# Patient Record
Sex: MALE | Race: WHITE | NOT HISPANIC OR LATINO | Employment: FULL TIME
[De-identification: names, ages, dates, MRNs, and addresses within clinical notes are randomized per-mention and may not be internally consistent; named-entity substitution may affect disease eponyms.]

---

## 2017-04-22 ENCOUNTER — HOSPITAL (OUTPATIENT)
Dept: OTHER | Age: 27
End: 2017-04-22
Attending: EMERGENCY MEDICINE

## 2017-04-22 LAB
ALBUMIN SERPL-MCNC: 4.3 GM/DL (ref 3.6–5.1)
ALP SERPL-CCNC: 47 UNIT/L (ref 45–117)
ALT SERPL-CCNC: 22 UNIT/L
AMPHETAMINES UR QL SCN>500 NG/ML: NEGATIVE
ANALYZER ANC (IANC): ABNORMAL
ANION GAP SERPL CALC-SCNC: 12 MMOL/L (ref 10–20)
AST SERPL-CCNC: 17 UNIT/L
BARBITURATES UR QL SCN>200 NG/ML: NEGATIVE
BENZODIAZ UR QL SCN>200 NG/ML: NEGATIVE
BILIRUB CONJ SERPL-MCNC: 0.1 MG/DL (ref 0–0.2)
BILIRUB SERPL-MCNC: 0.7 MG/DL (ref 0.2–1)
BUN SERPL-MCNC: 12 MG/DL (ref 6–20)
BUN/CREAT SERPL: 14 (ref 7–25)
BZE UR QL SCN>150 NG/ML: NEGATIVE
CALCIUM SERPL-MCNC: 9 MG/DL (ref 8.4–10.2)
CANNABINOIDS UR QL SCN>50 NG/ML: NEGATIVE
CHLORIDE: 101 MMOL/L (ref 98–107)
CO2 SERPL-SCNC: 28 MMOL/L (ref 21–32)
CREAT SERPL-MCNC: 0.85 MG/DL (ref 0.67–1.17)
ERYTHROCYTE [DISTWIDTH] IN BLOOD: 12.7 % (ref 11–15)
ETHANOL SERPL-MCNC: NORMAL MG/DL
GLUCOSE SERPL-MCNC: 95 MG/DL (ref 65–99)
HEMATOCRIT: 38.8 % (ref 39–51)
HGB BLD-MCNC: 13.8 GM/DL (ref 13–17)
MCH RBC QN AUTO: 29.7 PG (ref 26–34)
MCHC RBC AUTO-ENTMCNC: 35.6 GM/DL (ref 32–36.5)
MCV RBC AUTO: 83.4 FL (ref 78–100)
METHADONE UR QL SCN>300 NG/ML: NEGATIVE NG/ML
OPIATES UR QL SCN>300 NG/ML: NEGATIVE
PCP UR QL SCN>25 NG/ML: NEGATIVE
PLATELET # BLD: 230 THOUSAND/MCL (ref 140–450)
POTASSIUM SERPL-SCNC: 4.2 MMOL/L (ref 3.4–5.1)
PROT SERPL-MCNC: 7.6 GM/DL (ref 6.4–8.2)
RBC # BLD: 4.65 MILLION/MCL (ref 4.5–5.9)
SODIUM SERPL-SCNC: 137 MMOL/L (ref 135–145)
WBC # BLD: 7.4 THOUSAND/MCL (ref 4.2–11)

## 2017-04-24 ENCOUNTER — HOSPITAL (OUTPATIENT)
Dept: OTHER | Age: 27
End: 2017-04-24
Attending: EMERGENCY MEDICINE

## 2017-04-25 ENCOUNTER — DIAGNOSTIC TRANS (OUTPATIENT)
Dept: OTHER | Age: 27
End: 2017-04-25

## 2021-04-05 ENCOUNTER — HOSPITAL ENCOUNTER (OUTPATIENT)
Age: 31
Discharge: HOME | End: 2021-04-05
Attending: PHYSICIAN ASSISTANT
Payer: MEDICAID

## 2021-04-05 VITALS
BODY MASS INDEX: 25.67 KG/M2 | TEMPERATURE: 98 F | WEIGHT: 200 LBS | SYSTOLIC BLOOD PRESSURE: 120 MMHG | OXYGEN SATURATION: 99 % | DIASTOLIC BLOOD PRESSURE: 67 MMHG | RESPIRATION RATE: 18 BRPM | HEIGHT: 74 IN | HEART RATE: 76 BPM

## 2021-04-05 DIAGNOSIS — J02.0 ACUTE STREPTOCOCCAL PHARYNGITIS: Primary | ICD-10-CM

## 2021-04-05 PROCEDURE — 87880 STREP A ASSAY W/OPTIC: CPT | Performed by: PHYSICIAN ASSISTANT

## 2021-04-05 PROCEDURE — 99203 OFFICE O/P NEW LOW 30 MIN: CPT | Performed by: PHYSICIAN ASSISTANT

## 2021-04-05 RX ORDER — ARIPIPRAZOLE 15 MG/1
15 TABLET ORAL DAILY
COMMUNITY

## 2021-04-05 RX ORDER — PENICILLIN V POTASSIUM 500 MG/1
500 TABLET ORAL 2 TIMES DAILY
Qty: 20 TABLET | Refills: 0 | Status: SHIPPED | OUTPATIENT
Start: 2021-04-05 | End: 2021-04-15

## 2021-04-05 RX ORDER — SERTRALINE HYDROCHLORIDE 100 MG/1
100 TABLET, FILM COATED ORAL DAILY
COMMUNITY

## 2021-04-05 RX ORDER — IBUPROFEN 600 MG/1
TABLET ORAL
Qty: 20 TABLET | Refills: 0 | Status: SHIPPED | OUTPATIENT
Start: 2021-04-05

## 2021-04-05 NOTE — ED PROVIDER NOTES
Patient Seen in: [facility]    History   Patient presents with:  Sore Throat    Stated Complaint: flu symptoms    HPI    79-year-old male presents with chief complaint of sore throat. Onset 1 week ago.   Patient reports associated generalized (Room air)       Current:/67   Pulse 76   Temp 98 °F (36.7 °C) (Temporal)   Resp 18   Ht 188 cm (6' 2\")   Wt 90.7 kg   SpO2 99%   BMI 25.68 kg/m²     PULSE OX within normal limits on room air as interpreted by this provider.     Constitutional: The p over serial reexaminations as previously documented. Results reviewed with patient. I have given the patient instructions regarding their diagnoses, expectations, follow up, and ER precautions.  I explained to the patient that emergent conditions may ar

## 2021-04-13 ENCOUNTER — APPOINTMENT (OUTPATIENT)
Dept: GENERAL RADIOLOGY | Age: 31
End: 2021-04-13
Attending: NURSE PRACTITIONER
Payer: MEDICAID

## 2021-04-13 ENCOUNTER — HOSPITAL ENCOUNTER (EMERGENCY)
Facility: HOSPITAL | Age: 31
Discharge: HOME | End: 2021-04-13
Attending: EMERGENCY MEDICINE
Payer: MEDICAID

## 2021-04-13 ENCOUNTER — HOSPITAL ENCOUNTER (OUTPATIENT)
Age: 31
Discharge: HOME | End: 2021-04-13
Payer: MEDICAID

## 2021-04-13 VITALS
DIASTOLIC BLOOD PRESSURE: 84 MMHG | SYSTOLIC BLOOD PRESSURE: 134 MMHG | RESPIRATION RATE: 18 BRPM | TEMPERATURE: 99 F | HEART RATE: 69 BPM | OXYGEN SATURATION: 97 %

## 2021-04-13 VITALS
WEIGHT: 200 LBS | OXYGEN SATURATION: 97 % | HEART RATE: 75 BPM | TEMPERATURE: 97 F | SYSTOLIC BLOOD PRESSURE: 120 MMHG | HEIGHT: 74 IN | RESPIRATION RATE: 16 BRPM | DIASTOLIC BLOOD PRESSURE: 71 MMHG | BODY MASS INDEX: 25.67 KG/M2

## 2021-04-13 DIAGNOSIS — R07.89 CHEST PAIN, ATYPICAL: Primary | ICD-10-CM

## 2021-04-13 DIAGNOSIS — R06.02 SHORTNESS OF BREATH: ICD-10-CM

## 2021-04-13 DIAGNOSIS — R05.9 COUGH: Primary | ICD-10-CM

## 2021-04-13 PROCEDURE — 99284 EMERGENCY DEPT VISIT MOD MDM: CPT

## 2021-04-13 PROCEDURE — 93000 ELECTROCARDIOGRAM COMPLETE: CPT | Performed by: NURSE PRACTITIONER

## 2021-04-13 PROCEDURE — 93010 ELECTROCARDIOGRAM REPORT: CPT | Performed by: EMERGENCY MEDICINE

## 2021-04-13 PROCEDURE — 36415 COLL VENOUS BLD VENIPUNCTURE: CPT

## 2021-04-13 PROCEDURE — 84484 ASSAY OF TROPONIN QUANT: CPT | Performed by: EMERGENCY MEDICINE

## 2021-04-13 PROCEDURE — 80047 BASIC METABLC PNL IONIZED CA: CPT | Performed by: NURSE PRACTITIONER

## 2021-04-13 PROCEDURE — 36415 COLL VENOUS BLD VENIPUNCTURE: CPT | Performed by: NURSE PRACTITIONER

## 2021-04-13 PROCEDURE — 85025 COMPLETE CBC W/AUTO DIFF WBC: CPT | Performed by: NURSE PRACTITIONER

## 2021-04-13 PROCEDURE — 93005 ELECTROCARDIOGRAM TRACING: CPT

## 2021-04-13 PROCEDURE — U0002 COVID-19 LAB TEST NON-CDC: HCPCS | Performed by: NURSE PRACTITIONER

## 2021-04-13 PROCEDURE — 99215 OFFICE O/P EST HI 40 MIN: CPT | Performed by: NURSE PRACTITIONER

## 2021-04-13 PROCEDURE — 71046 X-RAY EXAM CHEST 2 VIEWS: CPT | Performed by: NURSE PRACTITIONER

## 2021-04-13 PROCEDURE — 84484 ASSAY OF TROPONIN QUANT: CPT

## 2021-04-13 RX ORDER — IBUPROFEN 600 MG/1
600 TABLET ORAL EVERY 8 HOURS PRN
Qty: 30 TABLET | Refills: 0 | Status: SHIPPED | OUTPATIENT
Start: 2021-04-13 | End: 2021-04-20

## 2021-04-13 RX ORDER — ALBUTEROL SULFATE 90 UG/1
2 AEROSOL, METERED RESPIRATORY (INHALATION) EVERY 4 HOURS PRN
Qty: 1 INHALER | Refills: 0 | Status: SHIPPED | OUTPATIENT
Start: 2021-04-13 | End: 2021-05-13

## 2021-04-13 NOTE — ED PROVIDER NOTES
Patient Seen in: [facility]      History   Patient presents with:  Shortness Of Breath    Stated Complaint: difficulty breathing/chills  post strep+    HPI/Subjective:   HPI    This is a well-appearing 82-year-old who presents with a chief co external ear normal.      Left Ear: Tympanic membrane, ear canal and external ear normal.      Nose: Nose normal.      Mouth/Throat:      Mouth: Mucous membranes are moist.      Pharynx: Oropharynx is clear. Uvula midline.  No pharyngeal swelling, oropharyn with patient that the blood work here as well as imaging was normal.  Not hypoxic, not tachycardic. Very well-appearing.   I have given the patient a list of primary care providers to follow-up with and I have encouraged him to call today to schedule an ap

## 2021-04-13 NOTE — ED INITIAL ASSESSMENT (HPI)
Dx with strep on 4/5 in this immediate care. Still on antibiotics. Now c/o cough and shortness of breath with exertion.

## 2021-04-14 NOTE — ED INITIAL ASSESSMENT (HPI)
Pt dx strep 4/5 on abx seen at IC today for cough/SOB that began today. PT had negative Cxr, covid test, EKG, and labs at IC. Pt returns to ED tonight with new c/o chest pain.

## 2021-04-14 NOTE — ED PROVIDER NOTES
Patient Seen in: [facility] AND [facility] Emergency Department      History   Patient presents with:  Chest Pain Angina    Stated Complaint: SOB and Cough from [facility]    HPI/Subjective:   HPI    22-year-old male with past medical history significant for schizop Normal range of motion. Neck supple. No tracheal deviation present. CV: s1s2+, RRR, no m/r/g, normal distal pulses  Pulmonary/Chest: CTA b/l with no rales, wheezes. No chest wall tenderness  Abdominal: Nontender. Nondistended. Soft.  Bowel sounds are no ibuprofen 600 MG Oral Tab  Take 1 tablet (600 mg total) by mouth every 8 (eight) hours as needed for Pain or Fever. Qty: 30 tablet Refills: 0    !! - Potential duplicate medications found. Please discuss with provider.

## 2021-04-15 ENCOUNTER — TELEMEDICINE (OUTPATIENT)
Dept: INTERNAL MEDICINE CLINIC | Facility: CLINIC | Age: 31
End: 2021-04-15
Payer: MEDICAID

## 2021-04-15 DIAGNOSIS — J02.9 SORE THROAT: ICD-10-CM

## 2021-04-15 DIAGNOSIS — R06.02 SHORTNESS OF BREATH: ICD-10-CM

## 2021-04-15 DIAGNOSIS — J02.0 STREP THROAT: Primary | ICD-10-CM

## 2021-04-15 PROCEDURE — 99203 OFFICE O/P NEW LOW 30 MIN: CPT | Performed by: INTERNAL MEDICINE

## 2021-04-15 NOTE — PROGRESS NOTES
Patient ID: Emily Hart is a 31year old male. Patient presents with: Follow - Up         HISTORY OF PRESENT ILLNESS:   Patient presents for above. This visit is conducted using Telemedicine with live, interactive video and audio.   Presented to the u (ABILIFY) 15 MG Oral Tab, Take 15 mg by mouth daily. , Disp: , Rfl:   •  Sertraline HCl 100 MG Oral Tab, Take 100 mg by mouth daily. , Disp: , Rfl:   •  ibuprofen 600 MG Oral Tab, Take 1 tablet (600 mg total) by mouth every 6 hours with food, Disp: 20 tablet vitals or do physical exam as this is a virtual video visit. Patient appears alert. No conversational dyspnea or distress. ASSESSMENT/PLAN:   1. Strep throat  · Complete antibiotic course. · Symptomatic treatment.     2. Sore throat  · Use menthol tali related consents and the risks discussed. Lastly, the patient confirmed that they were in [place]. Included in this visit, time may have been spent reviewing labs, medications, radiology tests and decision making.  Appropriate medical decision-making

## 2021-06-01 ENCOUNTER — TELEMEDICINE (OUTPATIENT)
Dept: INTERNAL MEDICINE CLINIC | Facility: CLINIC | Age: 31
End: 2021-06-01
Payer: MEDICAID

## 2021-06-01 ENCOUNTER — NURSE TRIAGE (OUTPATIENT)
Dept: INTERNAL MEDICINE CLINIC | Facility: CLINIC | Age: 31
End: 2021-06-01

## 2021-06-01 DIAGNOSIS — G47.10 EXCESSIVE SLEEPINESS: Primary | ICD-10-CM

## 2021-06-01 PROBLEM — F20.89 OTHER SCHIZOPHRENIA (HCC): Status: ACTIVE | Noted: 2021-06-01

## 2021-06-01 PROCEDURE — 99213 OFFICE O/P EST LOW 20 MIN: CPT | Performed by: INTERNAL MEDICINE

## 2021-06-01 NOTE — TELEPHONE ENCOUNTER
Action Requested: Summary for Provider     []  Critical Lab, Recommendations Needed  [] Need Additional Advice  []   FYI    []   Need Orders  [] Need Medications Sent to Pharmacy  []  Other     SUMMARY: Patient reports he has been micro-sleeping while edd

## 2021-06-01 NOTE — PROGRESS NOTES
Patient ID: Courtney Bridges is a 36year old male. Patient presents with:  Sleep Problem         HISTORY OF PRESENT ILLNESS:   Patient presents for above. This visit is conducted using Telemedicine with live, interactive video and audio.   Patient states h Never Used    Substance and Sexual Activity      Alcohol use: Yes        Comment: occ'l use      Drug use: Not on file      Sexual activity: Not on file    Other Topics      Concerns:        Not on file    Social History Narrative      Not on file    Carlos completed using two-way, real-time interactive audio and video communication.  This has been done in good kike to provide continuity of care in the best interest of the provider-patient relationship, due to the [address]

## 2021-06-07 ENCOUNTER — OFFICE VISIT (OUTPATIENT)
Dept: SLEEP CENTER | Age: 31
End: 2021-06-07
Attending: INTERNAL MEDICINE
Payer: MEDICAID

## 2021-06-07 DIAGNOSIS — G47.33 OSA (OBSTRUCTIVE SLEEP APNEA): Primary | ICD-10-CM

## 2021-06-07 DIAGNOSIS — G47.10 EXCESSIVE SLEEPINESS: ICD-10-CM

## 2021-06-07 PROCEDURE — 95806 SLEEP STUDY UNATT&RESP EFFT: CPT

## 2021-06-11 NOTE — PROCEDURES
[address] [facility]  Accredited by the [employer] (AASM)    PATIENT'S NAME: Mary Keyes   ATTENDING PHYSICIAN: Kelly Youngblood MD   REFERRING PHYSICIAN: Kelly Youngblood MD   PATIENT ACCOUNT #: [de-identified] LOCATION: Sleep Center (ICD-10 code G47.33). RECOMMENDATIONS:    1. Consider CPAP titration in this symptomatic patient. 2.   Avoid alcohol. 3.   Avoid sedating drug. 4.   Patient should not drive if at all sleepy.     Please do not hesitate to contact me if there is

## 2021-06-14 ENCOUNTER — TELEPHONE (OUTPATIENT)
Dept: INTERNAL MEDICINE CLINIC | Facility: CLINIC | Age: 31
End: 2021-06-14

## 2021-06-14 NOTE — TELEPHONE ENCOUNTER
Dr Augustin Agarwal, patient calling for results on sleep study, please reiew and comment on  6/10/21 sleep study   Please reply to pool: ZION Thibodeaux

## 2021-06-22 ENCOUNTER — OFFICE VISIT (OUTPATIENT)
Dept: INTERNAL MEDICINE CLINIC | Facility: CLINIC | Age: 31
End: 2021-06-22
Payer: MEDICAID

## 2021-06-22 ENCOUNTER — ORDER TRANSCRIPTION (OUTPATIENT)
Dept: SLEEP CENTER | Age: 31
End: 2021-06-22

## 2021-06-22 VITALS
SYSTOLIC BLOOD PRESSURE: 120 MMHG | BODY MASS INDEX: 25.93 KG/M2 | HEIGHT: 74 IN | DIASTOLIC BLOOD PRESSURE: 73 MMHG | WEIGHT: 202 LBS | HEART RATE: 74 BPM

## 2021-06-22 DIAGNOSIS — G47.10 EXCESSIVE SLEEPINESS: Primary | ICD-10-CM

## 2021-06-22 DIAGNOSIS — G47.33 OBSTRUCTIVE SLEEP APNEA: ICD-10-CM

## 2021-06-22 DIAGNOSIS — G47.33 OBSTRUCTIVE SLEEP APNEA (ADULT) (PEDIATRIC): Primary | ICD-10-CM

## 2021-06-22 PROCEDURE — 3074F SYST BP LT 130 MM HG: CPT | Performed by: INTERNAL MEDICINE

## 2021-06-22 PROCEDURE — 3008F BODY MASS INDEX DOCD: CPT | Performed by: INTERNAL MEDICINE

## 2021-06-22 PROCEDURE — 99213 OFFICE O/P EST LOW 20 MIN: CPT | Performed by: INTERNAL MEDICINE

## 2021-06-22 PROCEDURE — 3078F DIAST BP <80 MM HG: CPT | Performed by: INTERNAL MEDICINE

## 2021-06-22 NOTE — PATIENT INSTRUCTIONS
Obstructive Sleep Apnea  Obstructive sleep apnea is a condition that causes your air passages to become narrowed or blocked during sleep. As a result, breathing stops for short periods.  Your body wakes up enough for breathing to start again, though you d type of PAP that is best for you. Follow-up care  Follow up with your healthcare provider, or as advised. A diagnosis of sleep apnea is made with a sleep study.  Your healthcare provider can tell you more about this test.   When to seek medical advice  See

## 2021-06-22 NOTE — PROGRESS NOTES
Patient ID: Mary Coffey is a 36year old male. Patient presents with: Follow - Up: Review sleep study results        HISTORY OF PRESENT ILLNESS:   HPI  Patient presents for above. Here for follow-up of sleep study results.   Patient having hypersomni Not on file    Social History Narrative      Not on file    Social Determinants of Health  Financial Resource Strain:       Difficulty of Paying Living Expenses:   Food Insecurity:       Worried About [address] in the Last Year:       Ran Out of medications. Return if symptoms worsen or fail to improve. This note was prepared using Meilishuo voice recognition dictation software. As a result errors may occur. When identified these errors have been corrected.  While every attempt is made t

## 2021-07-21 ENCOUNTER — LAB ENCOUNTER (OUTPATIENT)
Dept: LAB | Age: 31
End: 2021-07-21
Attending: INTERNAL MEDICINE
Payer: MEDICAID

## 2021-07-21 ENCOUNTER — MED REC SCAN ONLY (OUTPATIENT)
Dept: INTERNAL MEDICINE CLINIC | Facility: CLINIC | Age: 31
End: 2021-07-21

## 2021-07-21 DIAGNOSIS — G47.33 OBSTRUCTIVE SLEEP APNEA (ADULT) (PEDIATRIC): ICD-10-CM

## 2021-07-22 LAB — SARS-COV-2 RNA RESP QL NAA+PROBE: NOT DETECTED

## 2021-07-23 ENCOUNTER — TELEPHONE (OUTPATIENT)
Dept: INTERNAL MEDICINE CLINIC | Facility: CLINIC | Age: 31
End: 2021-07-23

## 2021-07-23 ENCOUNTER — ORDER TRANSCRIPTION (OUTPATIENT)
Dept: SLEEP CENTER | Age: 31
End: 2021-07-23

## 2021-07-23 DIAGNOSIS — G47.33 OBSTRUCTIVE SLEEP APNEA (ADULT) (PEDIATRIC): Primary | ICD-10-CM

## 2021-07-23 DIAGNOSIS — G47.33 OBSTRUCTIVE SLEEP APNEA: Primary | ICD-10-CM

## 2021-07-23 NOTE — TELEPHONE ENCOUNTER
Dr. Norma Andre, CPAP titration was denied by insurance. Would you like to order a APAP machine, complete a peer to peer, or refer to Pulmonary?

## 2021-07-23 NOTE — TELEPHONE ENCOUNTER
Per Daisha Sanchez with the [facility], patient's insurance has denied the CPAP titration sleep study. An order can be placed for an auto CPAP machine through the insurance or a peer to peer review may be done.  To schedule a peer to peer review please call

## 2021-07-23 NOTE — TELEPHONE ENCOUNTER
Per patient he needs a response before 4PM today otherwise they will cancel his sleep study and patient will wait again for a couple of months again.

## 2021-07-24 NOTE — TELEPHONE ENCOUNTER
Pulmonary Office Staff - Please assist with appointment per Dr. Sherry Tatum, Internal Medicine. Thank you      Patient called office. RN informed patient of provider's message below. RN provided patient with referral information, patient wrote it down.

## 2021-07-26 NOTE — TELEPHONE ENCOUNTER
Spoke with pt who states he would like to come in tomorrow 7/27/21 time 1:00 provided. Pt voiced understanding. [employer] ins provided to pt.

## 2021-07-27 ENCOUNTER — OFFICE VISIT (OUTPATIENT)
Dept: PULMONOLOGY | Facility: CLINIC | Age: 31
End: 2021-07-27
Payer: MEDICAID

## 2021-07-27 VITALS
RESPIRATION RATE: 16 BRPM | HEART RATE: 60 BPM | BODY MASS INDEX: 26 KG/M2 | SYSTOLIC BLOOD PRESSURE: 125 MMHG | OXYGEN SATURATION: 100 % | WEIGHT: 201 LBS | DIASTOLIC BLOOD PRESSURE: 82 MMHG

## 2021-07-27 DIAGNOSIS — G47.33 OBSTRUCTIVE SLEEP APNEA: Primary | ICD-10-CM

## 2021-07-27 PROCEDURE — 3074F SYST BP LT 130 MM HG: CPT | Performed by: PHYSICIAN ASSISTANT

## 2021-07-27 PROCEDURE — 99243 OFF/OP CNSLTJ NEW/EST LOW 30: CPT | Performed by: PHYSICIAN ASSISTANT

## 2021-07-27 PROCEDURE — 3079F DIAST BP 80-89 MM HG: CPT | Performed by: PHYSICIAN ASSISTANT

## 2021-07-27 NOTE — PROGRESS NOTES
Pulmonary Consult Note    History of Present Illness:  Tamiko Chaudhary is a 36year old male presenting to pulmonary clinic today for NORMA. 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  The patient uvula enlargement. Mallampati class 2. Cardio: Regular rate and rhythm. Normal S1 and S2. No murmurs. Respiratory: Thorax symmetrical with no labored breathing. Clear to ausculation bilaterally with symmetrical breath sounds.  No wheezing, rhonchi, rales

## 2021-07-29 ENCOUNTER — TELEPHONE (OUTPATIENT)
Dept: PULMONOLOGY | Facility: CLINIC | Age: 31
End: 2021-07-29

## 2021-08-20 ENCOUNTER — OFFICE VISIT (OUTPATIENT)
Dept: PULMONOLOGY | Facility: CLINIC | Age: 31
End: 2021-08-20
Payer: MEDICAID

## 2021-08-20 VITALS
BODY MASS INDEX: 26.95 KG/M2 | DIASTOLIC BLOOD PRESSURE: 72 MMHG | TEMPERATURE: 99 F | WEIGHT: 210 LBS | HEART RATE: 69 BPM | OXYGEN SATURATION: 97 % | SYSTOLIC BLOOD PRESSURE: 120 MMHG | HEIGHT: 74 IN

## 2021-08-20 DIAGNOSIS — F20.9 SCHIZOPHRENIA, UNSPECIFIED TYPE (HCC): ICD-10-CM

## 2021-08-20 DIAGNOSIS — G47.9 SLEEP DISTURBANCE: ICD-10-CM

## 2021-08-20 DIAGNOSIS — G47.33 OSA (OBSTRUCTIVE SLEEP APNEA): Primary | ICD-10-CM

## 2021-08-20 PROCEDURE — 3078F DIAST BP <80 MM HG: CPT | Performed by: PHYSICIAN ASSISTANT

## 2021-08-20 PROCEDURE — 3008F BODY MASS INDEX DOCD: CPT | Performed by: PHYSICIAN ASSISTANT

## 2021-08-20 PROCEDURE — 3074F SYST BP LT 130 MM HG: CPT | Performed by: PHYSICIAN ASSISTANT

## 2021-08-20 PROCEDURE — 99213 OFFICE O/P EST LOW 20 MIN: CPT | Performed by: PHYSICIAN ASSISTANT

## 2021-08-20 NOTE — PROGRESS NOTES
Pulmonary Progress Note      History of Present Illness:  Reed Sin is a 36year old male presenting to pulmonary clinic today for follow-up of NORMA. 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  The patient has bee of schizophrenia and depression.  He is having difficulty tolerating APAP due to mask leak as well as pressure settings feel too strong.   -Plan:  -Change pressure to APAP 5-10 CWP  -New facemask and patient to try nasal style  -Check data download again in

## 2021-10-25 ENCOUNTER — TELEPHONE (OUTPATIENT)
Dept: PULMONOLOGY | Facility: CLINIC | Age: 31
End: 2021-10-25

## 2021-10-25 NOTE — TELEPHONE ENCOUNTER
----- Message from Milo Hidalgo PA-C sent at 10/25/2021 11:26 AM CDT -----  Regarding: Data download  RN: Please facilitate 30-day data download. Thanks!

## 2021-10-26 NOTE — TELEPHONE ENCOUNTER
Data download is excellent with good efficacy and compliance. Average daily usage is 8 hours and 55 minutes with residual respiratory events of 1.8/h on APAP 5-10 CWP.

## 2022-01-10 ENCOUNTER — TELEPHONE (OUTPATIENT)
Dept: PULMONOLOGY | Facility: CLINIC | Age: 32
End: 2022-01-10

## 2022-01-10 NOTE — TELEPHONE ENCOUNTER
Physician order for CPAP supplies received from [facility] and placed in [address] [place] [address] folder for signature.

## 2022-01-12 NOTE — TELEPHONE ENCOUNTER
Order signed by Holly Vargas and faxed to [facility]. Confirmation received. Signed order sent to scanning.

## 2022-03-23 ENCOUNTER — OFFICE VISIT (OUTPATIENT)
Dept: FAMILY MEDICINE CLINIC | Facility: CLINIC | Age: 32
End: 2022-03-23
Payer: MEDICAID

## 2022-03-23 ENCOUNTER — HOSPITAL ENCOUNTER (OUTPATIENT)
Dept: GENERAL RADIOLOGY | Age: 32
Discharge: HOME | End: 2022-03-23
Attending: NURSE PRACTITIONER
Payer: MEDICAID

## 2022-03-23 VITALS
SYSTOLIC BLOOD PRESSURE: 119 MMHG | WEIGHT: 217 LBS | HEART RATE: 65 BPM | BODY MASS INDEX: 27.85 KG/M2 | HEIGHT: 74 IN | DIASTOLIC BLOOD PRESSURE: 69 MMHG

## 2022-03-23 DIAGNOSIS — R09.81 NASAL CONGESTION: Primary | ICD-10-CM

## 2022-03-23 DIAGNOSIS — S09.92XA INJURY OF NOSE, INITIAL ENCOUNTER: ICD-10-CM

## 2022-03-23 DIAGNOSIS — R09.81 NASAL CONGESTION: ICD-10-CM

## 2022-03-23 PROCEDURE — 3078F DIAST BP <80 MM HG: CPT | Performed by: NURSE PRACTITIONER

## 2022-03-23 PROCEDURE — 3008F BODY MASS INDEX DOCD: CPT | Performed by: NURSE PRACTITIONER

## 2022-03-23 PROCEDURE — 99204 OFFICE O/P NEW MOD 45 MIN: CPT | Performed by: NURSE PRACTITIONER

## 2022-03-23 PROCEDURE — 3074F SYST BP LT 130 MM HG: CPT | Performed by: NURSE PRACTITIONER

## 2022-03-23 PROCEDURE — 70160 X-RAY EXAM OF NASAL BONES: CPT | Performed by: NURSE PRACTITIONER

## 2022-03-23 RX ORDER — OXCARBAZEPINE 300 MG/1
300 TABLET, FILM COATED ORAL 2 TIMES DAILY
COMMUNITY
Start: 2022-02-24

## 2022-03-23 RX ORDER — FLUTICASONE PROPIONATE 50 MCG
2 SPRAY, SUSPENSION (ML) NASAL DAILY
Qty: 18.2 ML | Refills: 1 | Status: SHIPPED | OUTPATIENT
Start: 2022-03-23

## 2022-04-06 ENCOUNTER — OFFICE VISIT (OUTPATIENT)
Dept: PULMONOLOGY | Facility: CLINIC | Age: 32
End: 2022-04-06
Payer: MEDICAID

## 2022-04-06 VITALS
HEIGHT: 74 IN | HEART RATE: 69 BPM | SYSTOLIC BLOOD PRESSURE: 130 MMHG | BODY MASS INDEX: 28.11 KG/M2 | RESPIRATION RATE: 14 BRPM | DIASTOLIC BLOOD PRESSURE: 72 MMHG | WEIGHT: 219 LBS | OXYGEN SATURATION: 97 %

## 2022-04-06 DIAGNOSIS — S09.92XS: ICD-10-CM

## 2022-04-06 DIAGNOSIS — J30.9 ALLERGIC RHINITIS, UNSPECIFIED SEASONALITY, UNSPECIFIED TRIGGER: ICD-10-CM

## 2022-04-06 DIAGNOSIS — G47.9 SLEEP DISTURBANCE: ICD-10-CM

## 2022-04-06 DIAGNOSIS — Z72.821 POOR SLEEP HYGIENE: ICD-10-CM

## 2022-04-06 DIAGNOSIS — G47.33 OSA (OBSTRUCTIVE SLEEP APNEA): Primary | ICD-10-CM

## 2022-04-06 PROCEDURE — 99213 OFFICE O/P EST LOW 20 MIN: CPT | Performed by: PHYSICIAN ASSISTANT

## 2022-04-06 PROCEDURE — 3075F SYST BP GE 130 - 139MM HG: CPT | Performed by: PHYSICIAN ASSISTANT

## 2022-04-06 PROCEDURE — 3008F BODY MASS INDEX DOCD: CPT | Performed by: PHYSICIAN ASSISTANT

## 2022-04-06 PROCEDURE — 3078F DIAST BP <80 MM HG: CPT | Performed by: PHYSICIAN ASSISTANT

## 2022-04-06 RX ORDER — FEXOFENADINE HCL 180 MG/1
180 TABLET ORAL DAILY
Qty: 30 TABLET | Refills: 0 | Status: SHIPPED | OUTPATIENT
Start: 2022-04-06

## 2022-04-11 ENCOUNTER — PATIENT MESSAGE (OUTPATIENT)
Dept: PULMONOLOGY | Facility: CLINIC | Age: 32
End: 2022-04-11

## 2022-04-11 ENCOUNTER — TELEPHONE (OUTPATIENT)
Dept: PULMONOLOGY | Facility: CLINIC | Age: 32
End: 2022-04-11

## 2022-04-11 NOTE — TELEPHONE ENCOUNTER
Florence Justin pt is doing a lot better with taking the fexofenadine that you prescribed. He is wants to get a referral or some information on how to fight the allergies.

## 2022-04-11 NOTE — TELEPHONE ENCOUNTER
Gypsy Carter, spoke with patient who says he still has congestion during the day. He feels better after he takes medications in the evening. He would prefer to get allergy testing. Advised him to keep appt he has with ENT on 4/26/22 if he would like further allergy symptom work up.

## 2022-04-11 NOTE — TELEPHONE ENCOUNTER
From: Марина Handler  To: José Miguel Cuadra PA-C  Sent: 4/11/2022 7:03 AM CDT  Subject: Allergies     Good morning. I took the allergy med like we talked about. I feel way better. I am able to sleep. I can breathe. I can get my life back on track Thank you! Now I am wondering what the next step should be to fight allergies. Can you give me a referral or some info?  Thanks again

## 2022-04-12 ENCOUNTER — TELEPHONE (OUTPATIENT)
Dept: PULMONOLOGY | Facility: CLINIC | Age: 32
End: 2022-04-12

## 2022-04-12 NOTE — TELEPHONE ENCOUNTER
Might be better to see an allergist then. Can refer him to Dr. Ashlyn Golden. He can also discuss with his pcp.

## 2022-05-02 ENCOUNTER — NURSE ONLY (OUTPATIENT)
Dept: ALLERGY | Facility: CLINIC | Age: 32
End: 2022-05-02
Payer: MEDICAID

## 2022-05-02 ENCOUNTER — OFFICE VISIT (OUTPATIENT)
Dept: ALLERGY | Facility: CLINIC | Age: 32
End: 2022-05-02
Payer: MEDICAID

## 2022-05-02 VITALS
HEIGHT: 72 IN | HEART RATE: 69 BPM | WEIGHT: 217 LBS | SYSTOLIC BLOOD PRESSURE: 135 MMHG | OXYGEN SATURATION: 96 % | RESPIRATION RATE: 18 BRPM | DIASTOLIC BLOOD PRESSURE: 79 MMHG | BODY MASS INDEX: 29.39 KG/M2 | TEMPERATURE: 98 F

## 2022-05-02 DIAGNOSIS — H10.10 SEASONAL AND PERENNIAL ALLERGIC RHINOCONJUNCTIVITIS: Primary | ICD-10-CM

## 2022-05-02 DIAGNOSIS — Z23 FLU VACCINE NEED: ICD-10-CM

## 2022-05-02 DIAGNOSIS — J30.2 SEASONAL AND PERENNIAL ALLERGIC RHINOCONJUNCTIVITIS: Primary | ICD-10-CM

## 2022-05-02 DIAGNOSIS — Z23 COVID-19 VACCINE SERIES STARTED: ICD-10-CM

## 2022-05-02 DIAGNOSIS — J30.89 ENVIRONMENTAL AND SEASONAL ALLERGIES: ICD-10-CM

## 2022-05-02 DIAGNOSIS — J30.89 SEASONAL AND PERENNIAL ALLERGIC RHINOCONJUNCTIVITIS: Primary | ICD-10-CM

## 2022-05-02 PROCEDURE — 95024 IQ TESTS W/ALLERGENIC XTRCS: CPT | Performed by: ALLERGY & IMMUNOLOGY

## 2022-05-02 PROCEDURE — 95004 PERQ TESTS W/ALRGNC XTRCS: CPT | Performed by: ALLERGY & IMMUNOLOGY

## 2022-05-02 PROCEDURE — 3075F SYST BP GE 130 - 139MM HG: CPT | Performed by: ALLERGY & IMMUNOLOGY

## 2022-05-02 PROCEDURE — 3078F DIAST BP <80 MM HG: CPT | Performed by: ALLERGY & IMMUNOLOGY

## 2022-05-02 PROCEDURE — 99244 OFF/OP CNSLTJ NEW/EST MOD 40: CPT | Performed by: ALLERGY & IMMUNOLOGY

## 2022-05-02 PROCEDURE — 3008F BODY MASS INDEX DOCD: CPT | Performed by: ALLERGY & IMMUNOLOGY

## 2022-05-02 RX ORDER — MONTELUKAST SODIUM 10 MG/1
10 TABLET ORAL NIGHTLY
Qty: 90 TABLET | Refills: 0 | Status: SHIPPED | OUTPATIENT
Start: 2022-05-02

## 2022-05-02 RX ORDER — LEVOCETIRIZINE DIHYDROCHLORIDE 5 MG/1
5 TABLET, FILM COATED ORAL NIGHTLY
Qty: 30 TABLET | Refills: 0 | Status: SHIPPED | OUTPATIENT
Start: 2022-05-02

## 2022-08-24 ENCOUNTER — OFFICE VISIT (OUTPATIENT)
Dept: OTOLARYNGOLOGY | Facility: CLINIC | Age: 32
End: 2022-08-24
Payer: MEDICAID

## 2022-08-24 DIAGNOSIS — J34.3 NASAL TURBINATE HYPERTROPHY: ICD-10-CM

## 2022-08-24 DIAGNOSIS — G47.33 OBSTRUCTIVE SLEEP APNEA: Primary | ICD-10-CM

## 2022-08-24 DIAGNOSIS — J34.2 NASAL SEPTAL DEVIATION: ICD-10-CM

## 2022-08-24 PROCEDURE — 99213 OFFICE O/P EST LOW 20 MIN: CPT | Performed by: SPECIALIST

## 2022-08-24 NOTE — PATIENT INSTRUCTIONS
You have a deviated left septum with a right inferior spur. You also have turbinate hypertrophy. He has antihistamines decongestants and nasal spray steroid sprays have not been helpful you can consider a septoplasty and Coblation of bilateral inferior turbinates. Aware that this may make it easier for you to use your nasal CPAP but will be very unlikely to cure it.

## 2022-08-26 ENCOUNTER — LAB ENCOUNTER (OUTPATIENT)
Dept: LAB | Facility: HOSPITAL | Age: 32
End: 2022-08-26
Attending: PHYSICIAN ASSISTANT
Payer: MEDICAID

## 2022-08-26 ENCOUNTER — OFFICE VISIT (OUTPATIENT)
Dept: PULMONOLOGY | Facility: CLINIC | Age: 32
End: 2022-08-26
Payer: MEDICAID

## 2022-08-26 VITALS
SYSTOLIC BLOOD PRESSURE: 131 MMHG | DIASTOLIC BLOOD PRESSURE: 79 MMHG | HEART RATE: 76 BPM | OXYGEN SATURATION: 97 % | WEIGHT: 214 LBS | HEIGHT: 72 IN | BODY MASS INDEX: 28.99 KG/M2

## 2022-08-26 DIAGNOSIS — R53.83 FATIGUE, UNSPECIFIED TYPE: ICD-10-CM

## 2022-08-26 DIAGNOSIS — G47.33 OBSTRUCTIVE SLEEP APNEA: Primary | ICD-10-CM

## 2022-08-26 LAB
DEPRECATED RDW RBC AUTO: 43.8 FL (ref 35.1–46.3)
ERYTHROCYTE [DISTWIDTH] IN BLOOD BY AUTOMATED COUNT: 13.8 % (ref 11–15)
HCT VFR BLD AUTO: 41.4 %
HGB BLD-MCNC: 13.8 G/DL
MCH RBC QN AUTO: 28.9 PG (ref 26–34)
MCHC RBC AUTO-ENTMCNC: 33.3 G/DL (ref 31–37)
MCV RBC AUTO: 86.8 FL
PLATELET # BLD AUTO: 270 10(3)UL (ref 150–450)
RBC # BLD AUTO: 4.77 X10(6)UL
TSI SER-ACNC: 0.96 MIU/ML (ref 0.36–3.74)
WBC # BLD AUTO: 7.2 X10(3) UL (ref 4–11)

## 2022-08-26 PROCEDURE — 36415 COLL VENOUS BLD VENIPUNCTURE: CPT | Performed by: PHYSICIAN ASSISTANT

## 2022-08-26 PROCEDURE — 84443 ASSAY THYROID STIM HORMONE: CPT | Performed by: PHYSICIAN ASSISTANT

## 2022-08-26 PROCEDURE — 3075F SYST BP GE 130 - 139MM HG: CPT | Performed by: PHYSICIAN ASSISTANT

## 2022-08-26 PROCEDURE — 85027 COMPLETE CBC AUTOMATED: CPT | Performed by: PHYSICIAN ASSISTANT

## 2022-08-26 PROCEDURE — 3008F BODY MASS INDEX DOCD: CPT | Performed by: PHYSICIAN ASSISTANT

## 2022-08-26 PROCEDURE — 3078F DIAST BP <80 MM HG: CPT | Performed by: PHYSICIAN ASSISTANT

## 2022-08-26 PROCEDURE — 99213 OFFICE O/P EST LOW 20 MIN: CPT | Performed by: PHYSICIAN ASSISTANT

## 2022-08-31 ENCOUNTER — TELEPHONE (OUTPATIENT)
Dept: OTOLARYNGOLOGY | Facility: CLINIC | Age: 32
End: 2022-08-31

## 2022-08-31 DIAGNOSIS — J34.2 DEVIATED SEPTUM: Primary | ICD-10-CM

## 2022-08-31 NOTE — TELEPHONE ENCOUNTER
Pt called to confirm pt is scheduled for surgery on 9-7-22. Questions. Can pt eat before surgery. Any testing.   Call pt

## 2022-09-02 DIAGNOSIS — G47.33 OBSTRUCTIVE SLEEP APNEA: Primary | ICD-10-CM

## 2022-09-02 DIAGNOSIS — J34.3 HYPERTROPHY OF INFERIOR NASAL TURBINATE: ICD-10-CM

## 2022-09-02 DIAGNOSIS — J34.2 DEVIATED NASAL SEPTUM: ICD-10-CM

## 2022-09-02 NOTE — TELEPHONE ENCOUNTER
Per Modesto Castro pt has out of network [employer] hmo plan with no out of network benefits and claim will be denied.  Please advise

## 2022-09-20 ENCOUNTER — LAB SERVICES (OUTPATIENT)
Dept: LAB | Age: 32
End: 2022-09-20

## 2022-09-20 DIAGNOSIS — Z01.812 ENCOUNTER FOR PREPROCEDURE SCREENING LABORATORY TESTING FOR COVID-19: Primary | ICD-10-CM

## 2022-09-20 DIAGNOSIS — Z11.52 ENCOUNTER FOR PREPROCEDURE SCREENING LABORATORY TESTING FOR COVID-19: Primary | ICD-10-CM

## 2022-09-20 PROCEDURE — U0005 INFEC AGEN DETEC AMPLI PROBE: HCPCS | Performed by: INTERNAL MEDICINE

## 2022-09-20 PROCEDURE — U0003 INFECTIOUS AGENT DETECTION BY NUCLEIC ACID (DNA OR RNA); SEVERE ACUTE RESPIRATORY SYNDROME CORONAVIRUS 2 (SARS-COV-2) (CORONAVIRUS DISEASE [COVID-19]), AMPLIFIED PROBE TECHNIQUE, MAKING USE OF HIGH THROUGHPUT TECHNOLOGIES AS DESCRIBED BY CMS-2020-01-R: HCPCS | Performed by: INTERNAL MEDICINE

## 2022-09-20 RX ORDER — ARIPIPRAZOLE 15 MG/1
15 TABLET ORAL DAILY
COMMUNITY
Start: 2022-07-06

## 2022-09-20 RX ORDER — OXCARBAZEPINE 300 MG/1
300 TABLET, FILM COATED ORAL 2 TIMES DAILY
COMMUNITY
Start: 2022-07-31

## 2022-09-21 LAB
SARS-COV-2 RNA RESP QL NAA+PROBE: NOT DETECTED
SERVICE CMNT-IMP: NORMAL
SERVICE CMNT-IMP: NORMAL

## 2022-09-22 ENCOUNTER — HOSPITAL ENCOUNTER (OUTPATIENT)
Age: 32
Discharge: HOME | End: 2022-09-22
Attending: OTOLARYNGOLOGY | Admitting: OTOLARYNGOLOGY

## 2022-09-22 ENCOUNTER — ANESTHESIA (OUTPATIENT)
Dept: SURGERY | Age: 32
End: 2022-09-22

## 2022-09-22 ENCOUNTER — ANESTHESIA EVENT (OUTPATIENT)
Dept: SURGERY | Age: 32
End: 2022-09-22

## 2022-09-22 VITALS
SYSTOLIC BLOOD PRESSURE: 142 MMHG | BODY MASS INDEX: 28.89 KG/M2 | OXYGEN SATURATION: 100 % | RESPIRATION RATE: 16 BRPM | HEIGHT: 73 IN | DIASTOLIC BLOOD PRESSURE: 81 MMHG | WEIGHT: 218 LBS | TEMPERATURE: 97.7 F | HEART RATE: 92 BPM

## 2022-09-22 DIAGNOSIS — J34.2 DEVIATED NASAL SEPTUM: ICD-10-CM

## 2022-09-22 DIAGNOSIS — G47.33 OBSTRUCTIVE SLEEP APNEA (ADULT) (PEDIATRIC): ICD-10-CM

## 2022-09-22 DIAGNOSIS — J30.1 ALLERGIC RHINITIS DUE TO POLLEN, UNSPECIFIED SEASONALITY: ICD-10-CM

## 2022-09-22 PROCEDURE — 10002807 HB RX 258: Performed by: STUDENT IN AN ORGANIZED HEALTH CARE EDUCATION/TRAINING PROGRAM

## 2022-09-22 PROCEDURE — 13000001 HB PHASE II RECOVERY EA 30 MINUTES: Performed by: OTOLARYNGOLOGY

## 2022-09-22 PROCEDURE — 13000034 HB BASIC CASE  S/U +1ST 15 MIN: Performed by: OTOLARYNGOLOGY

## 2022-09-22 PROCEDURE — 10002800 HB RX 250 W HCPCS: Performed by: STUDENT IN AN ORGANIZED HEALTH CARE EDUCATION/TRAINING PROGRAM

## 2022-09-22 PROCEDURE — 10002803 HB RX 637: Performed by: OTOLARYNGOLOGY

## 2022-09-22 PROCEDURE — 13000003 HB ANESTHESIA  GENERAL EA ADD MINUTE: Performed by: OTOLARYNGOLOGY

## 2022-09-22 PROCEDURE — 10004451 HB PACU RECOVERY 1ST 30 MINUTES: Performed by: OTOLARYNGOLOGY

## 2022-09-22 PROCEDURE — 10004452 HB PACU ADDL 30 MINUTES: Performed by: OTOLARYNGOLOGY

## 2022-09-22 PROCEDURE — 10006023 HB SUPPLY 272: Performed by: OTOLARYNGOLOGY

## 2022-09-22 PROCEDURE — 13000035 HB BASIC CASE EA ADD MINUTE: Performed by: OTOLARYNGOLOGY

## 2022-09-22 PROCEDURE — 10002801 HB RX 250 W/O HCPCS: Performed by: STUDENT IN AN ORGANIZED HEALTH CARE EDUCATION/TRAINING PROGRAM

## 2022-09-22 PROCEDURE — 13000002 HB ANESTHESIA  GENERAL  S/U + 1ST 15 MIN: Performed by: OTOLARYNGOLOGY

## 2022-09-22 PROCEDURE — 88304 TISSUE EXAM BY PATHOLOGIST: CPT | Performed by: OTOLARYNGOLOGY

## 2022-09-22 PROCEDURE — 10002803 HB RX 637

## 2022-09-22 PROCEDURE — 10002801 HB RX 250 W/O HCPCS: Performed by: OTOLARYNGOLOGY

## 2022-09-22 PROCEDURE — 10002807 HB RX 258: Performed by: OTOLARYNGOLOGY

## 2022-09-22 RX ORDER — SODIUM CHLORIDE, SODIUM LACTATE, POTASSIUM CHLORIDE, CALCIUM CHLORIDE 600; 310; 30; 20 MG/100ML; MG/100ML; MG/100ML; MG/100ML
INJECTION, SOLUTION INTRAVENOUS CONTINUOUS
Status: DISCONTINUED | OUTPATIENT
Start: 2022-09-22 | End: 2022-09-22 | Stop reason: HOSPADM

## 2022-09-22 RX ORDER — HYDROCODONE BITARTRATE AND ACETAMINOPHEN 5; 325 MG/1; MG/1
1 TABLET ORAL ONCE
Status: COMPLETED | OUTPATIENT
Start: 2022-09-22 | End: 2022-09-22

## 2022-09-22 RX ORDER — PROCHLORPERAZINE EDISYLATE 5 MG/ML
5 INJECTION INTRAMUSCULAR; INTRAVENOUS
Status: DISCONTINUED | OUTPATIENT
Start: 2022-09-22 | End: 2022-09-22 | Stop reason: HOSPADM

## 2022-09-22 RX ORDER — BACITRACIN ZINC 500 [USP'U]/G
OINTMENT TOPICAL PRN
Status: DISCONTINUED | OUTPATIENT
Start: 2022-09-22 | End: 2022-09-22 | Stop reason: HOSPADM

## 2022-09-22 RX ORDER — LIDOCAINE HYDROCHLORIDE 20 MG/ML
INJECTION, SOLUTION INFILTRATION; PERINEURAL PRN
Status: DISCONTINUED | OUTPATIENT
Start: 2022-09-22 | End: 2022-09-22

## 2022-09-22 RX ORDER — SERTRALINE HYDROCHLORIDE 100 MG/1
100 TABLET, FILM COATED ORAL DAILY
COMMUNITY

## 2022-09-22 RX ORDER — NALBUPHINE HYDROCHLORIDE 10 MG/ML
2.5 INJECTION, SOLUTION INTRAMUSCULAR; INTRAVENOUS; SUBCUTANEOUS
Status: DISCONTINUED | OUTPATIENT
Start: 2022-09-22 | End: 2022-09-22 | Stop reason: HOSPADM

## 2022-09-22 RX ORDER — EPHEDRINE SULFATE/0.9% NACL/PF 50 MG/10ML
5 SYRINGE (ML) INTRAVENOUS
Status: DISCONTINUED | OUTPATIENT
Start: 2022-09-22 | End: 2022-09-22 | Stop reason: HOSPADM

## 2022-09-22 RX ORDER — DIPHENHYDRAMINE HCL 25 MG
25 CAPSULE ORAL
Status: DISCONTINUED | OUTPATIENT
Start: 2022-09-22 | End: 2022-09-22 | Stop reason: HOSPADM

## 2022-09-22 RX ORDER — DIPHENHYDRAMINE HYDROCHLORIDE 50 MG/ML
25 INJECTION INTRAMUSCULAR; INTRAVENOUS EVERY 4 HOURS PRN
Status: DISCONTINUED | OUTPATIENT
Start: 2022-09-22 | End: 2022-09-22 | Stop reason: HOSPADM

## 2022-09-22 RX ORDER — ROCURONIUM BROMIDE 10 MG/ML
INJECTION, SOLUTION INTRAVENOUS PRN
Status: DISCONTINUED | OUTPATIENT
Start: 2022-09-22 | End: 2022-09-22

## 2022-09-22 RX ORDER — DEXAMETHASONE SODIUM PHOSPHATE 4 MG/ML
INJECTION, SOLUTION INTRA-ARTICULAR; INTRALESIONAL; INTRAMUSCULAR; INTRAVENOUS; SOFT TISSUE PRN
Status: DISCONTINUED | OUTPATIENT
Start: 2022-09-22 | End: 2022-09-22

## 2022-09-22 RX ORDER — ACETAMINOPHEN 325 MG/1
650 TABLET ORAL
Status: DISCONTINUED | OUTPATIENT
Start: 2022-09-22 | End: 2022-09-22 | Stop reason: HOSPADM

## 2022-09-22 RX ORDER — HYDROCODONE BITARTRATE AND ACETAMINOPHEN 5; 325 MG/1; MG/1
TABLET ORAL
Status: DISCONTINUED
Start: 2022-09-22 | End: 2022-09-22 | Stop reason: HOSPADM

## 2022-09-22 RX ORDER — ONDANSETRON 2 MG/ML
INJECTION INTRAMUSCULAR; INTRAVENOUS PRN
Status: DISCONTINUED | OUTPATIENT
Start: 2022-09-22 | End: 2022-09-22

## 2022-09-22 RX ORDER — LIDOCAINE HYDROCHLORIDE AND EPINEPHRINE 10; 10 MG/ML; UG/ML
INJECTION, SOLUTION INFILTRATION; PERINEURAL PRN
Status: DISCONTINUED | OUTPATIENT
Start: 2022-09-22 | End: 2022-09-22 | Stop reason: HOSPADM

## 2022-09-22 RX ORDER — ONDANSETRON 4 MG/1
4 TABLET, ORALLY DISINTEGRATING ORAL
Status: DISCONTINUED | OUTPATIENT
Start: 2022-09-22 | End: 2022-09-22 | Stop reason: HOSPADM

## 2022-09-22 RX ORDER — NALOXONE HCL 0.4 MG/ML
0.2 VIAL (ML) INJECTION EVERY 5 MIN PRN
Status: DISCONTINUED | OUTPATIENT
Start: 2022-09-22 | End: 2022-09-22 | Stop reason: HOSPADM

## 2022-09-22 RX ORDER — HYDRALAZINE HYDROCHLORIDE 20 MG/ML
5 INJECTION INTRAMUSCULAR; INTRAVENOUS EVERY 10 MIN PRN
Status: DISCONTINUED | OUTPATIENT
Start: 2022-09-22 | End: 2022-09-22 | Stop reason: HOSPADM

## 2022-09-22 RX ORDER — OXYMETAZOLINE HYDROCHLORIDE 0.05 G/100ML
SPRAY NASAL PRN
Status: DISCONTINUED | OUTPATIENT
Start: 2022-09-22 | End: 2022-09-22 | Stop reason: HOSPADM

## 2022-09-22 RX ORDER — ONDANSETRON 2 MG/ML
4 INJECTION INTRAMUSCULAR; INTRAVENOUS 2 TIMES DAILY PRN
Status: DISCONTINUED | OUTPATIENT
Start: 2022-09-22 | End: 2022-09-22 | Stop reason: HOSPADM

## 2022-09-22 RX ORDER — PROPOFOL 10 MG/ML
INJECTION, EMULSION INTRAVENOUS PRN
Status: DISCONTINUED | OUTPATIENT
Start: 2022-09-22 | End: 2022-09-22

## 2022-09-22 RX ORDER — MIDAZOLAM HYDROCHLORIDE 1 MG/ML
INJECTION, SOLUTION INTRAMUSCULAR; INTRAVENOUS PRN
Status: DISCONTINUED | OUTPATIENT
Start: 2022-09-22 | End: 2022-09-22

## 2022-09-22 RX ORDER — SODIUM CHLORIDE, SODIUM LACTATE, POTASSIUM CHLORIDE, CALCIUM CHLORIDE 600; 310; 30; 20 MG/100ML; MG/100ML; MG/100ML; MG/100ML
INJECTION, SOLUTION INTRAVENOUS CONTINUOUS PRN
Status: DISCONTINUED | OUTPATIENT
Start: 2022-09-22 | End: 2022-09-22

## 2022-09-22 RX ADMIN — LIDOCAINE HYDROCHLORIDE 3 ML: 20 INJECTION, SOLUTION INFILTRATION; PERINEURAL at 09:20

## 2022-09-22 RX ADMIN — FENTANYL CITRATE 25 MCG: 50 INJECTION, SOLUTION INTRAMUSCULAR; INTRAVENOUS at 09:52

## 2022-09-22 RX ADMIN — SODIUM CHLORIDE, POTASSIUM CHLORIDE, SODIUM LACTATE AND CALCIUM CHLORIDE: 600; 310; 30; 20 INJECTION, SOLUTION INTRAVENOUS at 08:14

## 2022-09-22 RX ADMIN — FENTANYL CITRATE 50 MCG: 50 INJECTION, SOLUTION INTRAMUSCULAR; INTRAVENOUS at 09:16

## 2022-09-22 RX ADMIN — MIDAZOLAM HYDROCHLORIDE 2 MG: 1 INJECTION, SOLUTION INTRAMUSCULAR; INTRAVENOUS at 09:16

## 2022-09-22 RX ADMIN — PROPOFOL 150 MG: 10 INJECTION, EMULSION INTRAVENOUS at 09:20

## 2022-09-22 RX ADMIN — DEXAMETHASONE SODIUM PHOSPHATE 10 MG: 4 INJECTION, SOLUTION INTRAMUSCULAR; INTRAVENOUS at 09:31

## 2022-09-22 RX ADMIN — SODIUM CHLORIDE, POTASSIUM CHLORIDE, SODIUM LACTATE AND CALCIUM CHLORIDE: 600; 310; 30; 20 INJECTION, SOLUTION INTRAVENOUS at 09:14

## 2022-09-22 RX ADMIN — ONDANSETRON 4 MG: 2 INJECTION INTRAMUSCULAR; INTRAVENOUS at 09:51

## 2022-09-22 RX ADMIN — HYDROCODONE BITARTRATE AND ACETAMINOPHEN 1 TABLET: 5; 325 TABLET ORAL at 10:41

## 2022-09-22 RX ADMIN — SUGAMMADEX 200 MG: 100 INJECTION, SOLUTION INTRAVENOUS at 09:56

## 2022-09-22 RX ADMIN — ROCURONIUM BROMIDE 50 MG: 10 INJECTION, SOLUTION INTRAVENOUS at 09:20

## 2022-09-22 ASSESSMENT — PAIN SCALES - GENERAL
PAINLEVEL_OUTOF10: 0
PAINLEVEL_OUTOF10: 0
PAINLEVEL_OUTOF10: 3
PAINLEVEL_OUTOF10: 0
PAINLEVEL_OUTOF10: 4

## 2022-09-22 ASSESSMENT — ACTIVITIES OF DAILY LIVING (ADL)
HISTORY OF FALLING IN THE LAST YEAR (PRIOR TO ADMISSION): NO
ADL_SHORT_OF_BREATH: NO
ADL_SCORE: 12
NEEDS_ASSIST: NO
ADL_BEFORE_ADMISSION: INDEPENDENT
RECENT_DECLINE_ADL: NO

## 2022-09-22 ASSESSMENT — COGNITIVE AND FUNCTIONAL STATUS - GENERAL
ARE YOU DEAF OR DO YOU HAVE SERIOUS DIFFICULTY  HEARING: YES
ARE YOU BLIND OR DO YOU HAVE SERIOUS DIFFICULTY SEEING, EVEN WHEN WEARING GLASSES: YES

## 2022-09-26 LAB
ASR DISCLAIMER: NORMAL
CASE RPRT: NORMAL
CLINICAL INFO: NORMAL
PATH REPORT.FINAL DX SPEC: NORMAL
PATH REPORT.GROSS SPEC: NORMAL

## 2024-02-21 ENCOUNTER — TELEPHONE (OUTPATIENT)
Dept: PULMONOLOGY | Facility: CLINIC | Age: 34
End: 2024-02-21

## 2024-02-21 NOTE — TELEPHONE ENCOUNTER
Received fax from [facility] for cpap supplies. Placed in Pasha's folder for signature.

## 2024-02-27 NOTE — TELEPHONE ENCOUNTER
Pasha signed order. Faxed to [facility]. Received fax confirmation and sent to scanning.

## 2024-03-06 ENCOUNTER — HOSPITAL ENCOUNTER (OUTPATIENT)
Age: 34
Discharge: HOME | End: 2024-03-06
Payer: MEDICAID

## 2024-03-06 VITALS
OXYGEN SATURATION: 97 % | TEMPERATURE: 98 F | SYSTOLIC BLOOD PRESSURE: 143 MMHG | DIASTOLIC BLOOD PRESSURE: 74 MMHG | HEART RATE: 86 BPM | RESPIRATION RATE: 16 BRPM

## 2024-03-06 DIAGNOSIS — G47.00 INSOMNIA, UNSPECIFIED TYPE: Primary | ICD-10-CM

## 2024-03-06 DIAGNOSIS — F20.9 SCHIZOPHRENIA, UNSPECIFIED TYPE (HCC): ICD-10-CM

## 2024-03-06 NOTE — ED INITIAL ASSESSMENT (HPI)
Pt has history of schizophrenia.  Pt states he has not been able to sleep for more than 4 hours.  Pt is on Abilify and would like something to help him with sleep.  Pt is not suicidal or homicidal.  Pt states he feels like his is living in [place] person and has some paranoia.    Pt has been on Trazodone in December for sleep.  Pt tried trazodone again without relief.

## 2024-03-07 NOTE — DISCHARGE INSTRUCTIONS
Call your doctor for further assistance.  Try calling the [facility] triage line for assistance.  With the emergency room if worsening symptoms, suicidal ideation, homicidal ideation, phi

## 2024-03-07 NOTE — ED PROVIDER NOTES
Patient Seen in: [facility]      History     Chief Complaint   Patient presents with    Eval-P     Stated Complaint: mental health    Subjective:   33-year-old male with schizophrenia and obstructive sleep apnea presents from home with complaints of difficulty sleeping.  States that he has schizophrenia.  Last physician evaluation the was in December.  Saw his psychiatry team in December and his Abilify was increased due to phi.  States 1 month later he started having difficulty sleeping.  His insurance recently changed so he is having difficulty following up.  He was seen 10 days ago at the [facility] for an initial evaluation.  No medications were prescribed.  He has been trying ZzzQuil and taking herbal teas at home.  States some days he is able to sleep, some days he is not.  Has current complaints of the world not feeling real to him, \"like I am out of body\".  Recently had some depression with passive SI.  No current suicidal thoughts.  No plans for suicide.  No previous history of suicide attempts.  He does note that he was manic about a week ago and felt indestructible but those symptoms have passed.  He has been eating and showering.  He has been going to work every day.  Notes some intermittent alcohol use, he does vape CBD.  He has no guns at home.  He was inpatient 7 years ago at initial diagnosis of schizophrenia but no other psychiatric admissions.  He is here requesting medication to help him sleep.    The history is provided by the patient. No  was used.         Objective:   Past Medical History:   Diagnosis Date    Schizophrenia ([facility])               History reviewed. No pertinent surgical history.             Social History     Socioeconomic History    Marital status: Single   Tobacco Use    Smoking status: Never    Smokeless tobacco: Never   Vaping Use    Vaping Use: Every day    Substances: CBD    Devices: Disposable, Pre-filled or refillable cartridge    Substance and Sexual Activity    Alcohol use: Yes     Comment: occ'l use    Drug use: Never              Review of Systems    Positive for stated complaint: mental health  Other systems are as noted in HPI.  Constitutional and vital signs reviewed.      All other systems reviewed and negative except as noted above.    Physical Exam     ED Triage Vitals [03/06/24 1800]   /74   Pulse 86   Resp 16   Temp 97.8 °F (36.6 °C)   Temp src Temporal   SpO2 97 %   O2 Device None (Room air)       Current:/74   Pulse 86   Temp 97.8 °F (36.6 °C) (Temporal)   Resp 16   SpO2 97%         Physical Exam  Vitals and nursing note reviewed.   Constitutional:       General: He is not in acute distress.     Appearance: Normal appearance. He is not ill-appearing or toxic-appearing.   HENT:      Head: Normocephalic and atraumatic.      Nose: Nose normal.      Mouth/Throat:      Mouth: Mucous membranes are moist.      Pharynx: Oropharynx is clear.   Eyes:      Pupils: Pupils are equal, round, and reactive to light.   Cardiovascular:      Rate and Rhythm: Normal rate and regular rhythm.      Pulses: Normal pulses.   Pulmonary:      Effort: Pulmonary effort is normal. No respiratory distress.      Breath sounds: Normal breath sounds.      Comments: Lungs clear.  No adventitious lung sounds.  No distress.  No hypoxia.  Pulse ox 97% ra. Which is normal    Abdominal:      General: Abdomen is flat.      Palpations: Abdomen is soft.   Musculoskeletal:         General: No signs of injury. Normal range of motion.      Cervical back: Normal range of motion and neck supple.   Skin:     General: Skin is warm and dry.      Capillary Refill: Capillary refill takes less than 2 seconds.   Neurological:      General: No focal deficit present.      Mental Status: He is alert and oriented to person, place, and time.      GCS: GCS eye subscore is 4. GCS verbal subscore is 5. GCS motor subscore is 6.   Psychiatric:         Attention and  Perception: Perception normal.         Mood and Affect: Mood and affect normal.         Speech: Speech normal.         Behavior: Behavior normal.         Thought Content: Thought content normal.         Cognition and Memory: Cognition and memory normal.         Judgment: Judgment normal.      Comments: Calm.  Appropriate.  Good insight into mental illness.Calm, appropriate. Good insight into mental  illness. Denies SI/HI. Denies hallucinations               ED Course   Labs Reviewed - No data to display      MDM          Medical Decision Making  Difficulty sleeping  Differential diagnosis: insomnia, phi  Patient is calm, appropriate, has good insight into his mental illness.  Denies current depression or suicidal ideation.  Feels safe at home.  No access to guns at home.  Denies current phi.  He does not appear intoxicated.  He is here today requesting prescription medications to help him sleep.  We are unable to manage mental illness/insomnia at the clinic.  He is safe for outpatient management.  [facility]erral for [facility] provided.  Patient states that he will likely go to the emergency room to see if they can give him a prescription  Results and plan of care discussed with the patient/family. They are in agreement with discharge. They understand to follow up with their primary doctor or the referral physician for further evaluation, especially if no improvement.  Also discussed the limitations of immediate care, patient is aware that if symptoms are worse they should go to the emergency room. Verbal and written discharge instructions were given.       Problems Addressed:  Insomnia, unspecified type: acute illness or injury  Schizophrenia, unspecified type (HCC): chronic illness or injury        Disposition and Plan     Clinical Impression:  1. Insomnia, unspecified type    2. Schizophrenia, unspecified type (HCC)         Disposition:  Discharge  3/6/2024  6:20 pm    Follow-up:  [facility]  Triage  [phone]  Call             Medications Prescribed:  Discharge Medication List as of 3/6/2024  6:20 PM

## 2024-04-23 ENCOUNTER — OFFICE VISIT (OUTPATIENT)
Dept: OTOLARYNGOLOGY | Facility: CLINIC | Age: 34
End: 2024-04-23
Payer: MEDICAID

## 2024-04-23 DIAGNOSIS — J34.3 HYPERTROPHY OF BOTH INFERIOR NASAL TURBINATES: ICD-10-CM

## 2024-04-23 DIAGNOSIS — J34.2 NASAL SEPTAL DEVIATION: ICD-10-CM

## 2024-04-23 DIAGNOSIS — J34.89 NASAL OBSTRUCTION: Primary | ICD-10-CM

## 2024-04-23 DIAGNOSIS — G47.33 OSA (OBSTRUCTIVE SLEEP APNEA): ICD-10-CM

## 2024-04-23 PROCEDURE — 31231 NASAL ENDOSCOPY DX: CPT | Performed by: STUDENT IN AN ORGANIZED HEALTH CARE EDUCATION/TRAINING PROGRAM

## 2024-04-23 PROCEDURE — 99214 OFFICE O/P EST MOD 30 MIN: CPT | Performed by: STUDENT IN AN ORGANIZED HEALTH CARE EDUCATION/TRAINING PROGRAM

## 2024-04-23 RX ORDER — AZELASTINE 1 MG/ML
2 SPRAY, METERED NASAL 2 TIMES DAILY
Qty: 30 ML | Refills: 0 | Status: SHIPPED | OUTPATIENT
Start: 2024-04-23 | End: 2024-04-23

## 2024-04-23 RX ORDER — AZELASTINE 1 MG/ML
2 SPRAY, METERED NASAL 2 TIMES DAILY
Qty: 90 ML | Refills: 0 | Status: SHIPPED | OUTPATIENT
Start: 2024-04-23

## 2024-04-23 NOTE — PROGRESS NOTES
Maurilio Peterson is a 33 year old male.   Chief Complaint   Patient presents with    Ear Problem     C/o ear clogged     Nose Problem     Pt reports trouble breathing through nose, had surgery previously. Symptoms returned        ASSESSMENT AND PLAN:   1. Nasal obstruction  Is a 33-year-old who has a history of schizophrenia and sleep apnea.  He had a turbinate reduction and a balloon sinuplasty at [facility] but he says that it did not help.  He is not getting good sleep and this is affecting his daily activities and driving.  He has not tried any nasal sprays.  He has been taking oral antihistamine for the last 2 months as the pollen has increased from trees which she is allergic to.  He reports constant difficulty using CPAP due to a hard time breathing through his nose    On exam he has a septal deviation to the right side I was unable to view the head of the middle turbinate.  His residual inferior turbinate hypertrophy on both sides as well as nasal valve collapse     Discussed his options he would like to pursue septoplasty.  Likely [facility] did not perform this as it would need general anesthesia.  This could be causing him difficulty with his CPAP use.  He has a history of schizophrenia and sleep is imperative for his wellbeing.  He has not tried nasal sprays we will try Astelin as well in addition to his oral antihistamine prior to sleep.  Also obtain a CT scan as he notes he had a nasal trauma that was also corrected in the past.  Will bring him back to review the scan and see his response to Astelin prior to arranging a procedure likely a septoplasty in the setting of needing better CPAP use and compliance for his sleep apnea and schizophrenia.  Discussed the surgery in detail including risks and benefits and alternatives    - CT [facility] (CPT=70486); Future    2. Nasal septal deviation      3. Hypertrophy of both inferior nasal turbinates      4. NORMA (obstructive sleep apnea)        The patient  indicates understanding of these issues and agrees to the plan.      EXAM:   There were no vitals taken for this visit.    Pertinent exam findings may also be noted above in assessment and plan     System Details   Skin Inspection - Normal.   Constitutional Overall appearance - Normal.   Head/Face Symmetric, TMJ tenderness not present    Eyes EOMI, PERRL   Right ear:  Canal clear, TM intact, no JETT   Left ear:  Canal clear, TM intact, no JETT   Nose: On exam he has a septal deviation to the right side I was unable to view the head of the middle turbinate.  His residual inferior turbinate hypertrophy on both sides as well as nasal valve collapse    Oral cavity/Oropharynx: No lesions or masses on inspection or palpation, tonsils symmetric    Neck: Soft without LAD, thyroid not enlarged  Voice clear/ no stridor   Other:      Scopes and Procedures:     Nasal Endoscopy Procedure Note     Due to inability for adequate examination of the nose and nasopharynx and need for magnification to perform the examination, endoscopy was performed.  Risks and benefits were discussed with patient/family and they have given verbal consent to proceed.    Pre-operative Diagnosis:   1. Nasal obstruction    2. Nasal septal deviation    3. Hypertrophy of both inferior nasal turbinates    4. NORMA (obstructive sleep apnea)        Post-operative Diagnosis: Same    Procedure: Diagnostic nasal endoscopy    Anesthesia: Topical anesthetic [place]     Surgeon Gustavo Crystal MD    EBL: 0cc    Procedure Detail & Findings:     After placement of topical anesthetic intranasally the endoscope was inserted into each nares and driven through the nasal cavity into the nasopharynx. The following findings were noted:    Septum: On exam he has a septal deviation to the right side I was unable to view the head of the middle turbinate.  His residual inferior turbinate hypertrophy on both sides    Middle meatus: Patent  Middle turbinates: Normal  Purulence: None  noted  Polyps: None noted  Nasopharynx and eustachian tube: No masses  Other: The middle and superior meatus, the turbinates, and the spheno-ethmoid recess were inspected and seen to be without significant abnormal findings.     Condition: Stable    Complications: Patient tolerated the procedure well with no immediate complication.    Gustavo Crystal MD        Current Outpatient Medications   Medication Sig Dispense Refill    azelastine 0.1 % Nasal Solution 2 sprays by Nasal route 2 (two) times daily. 30 mL 0    ARIPiprazole 15 MG Oral Tab Take 1 tablet (15 mg total) by mouth daily.      levocetirizine (XYZAL) 5 MG Oral Tab Take 1 tablet (5 mg total) by mouth nightly. 30 tablet 0    montelukast (SINGULAIR) 10 MG Oral Tab Take 1 tablet (10 mg total) by mouth nightly. 90 tablet 0    OXcarbazepine 300 MG Oral Tab Take 300 mg by mouth 2 (two) times daily. (Patient not taking: Reported on 3/6/2024)      fluticasone propionate 50 MCG/ACT Nasal Suspension 2 sprays by Each Nare route daily. 18.2 mL 1    Sertraline HCl 100 MG Oral Tab Take 100 mg by mouth daily. (Patient not taking: Reported on 3/6/2024)        Past Medical History:    Schizophrenia (HCC)      Social History:  Social History     Socioeconomic History    Marital status: Single   Tobacco Use    Smoking status: Never    Smokeless tobacco: Never   Vaping Use    Vaping status: Every Day    Substances: CBD    Devices: Disposable, Pre-filled or refillable cartridge   Substance and Sexual Activity    Alcohol use: Yes     Comment: occ'l use    Drug use: Never          Gustavo Crystal MD  4/23/2024  7:27 AM

## 2024-04-23 NOTE — TELEPHONE ENCOUNTER
Refill request sent to pharmacy on 4/23/24 for Azelastine, last OV on 4/23/24.  Patient's insurance requires 90 days.

## 2024-04-25 ENCOUNTER — OFFICE VISIT (OUTPATIENT)
Dept: PULMONOLOGY | Facility: CLINIC | Age: 34
End: 2024-04-25
Payer: MEDICAID

## 2024-04-25 VITALS
SYSTOLIC BLOOD PRESSURE: 128 MMHG | DIASTOLIC BLOOD PRESSURE: 74 MMHG | RESPIRATION RATE: 14 BRPM | OXYGEN SATURATION: 97 % | HEART RATE: 52 BPM | BODY MASS INDEX: 27.17 KG/M2 | WEIGHT: 205 LBS | HEIGHT: 73 IN

## 2024-04-25 DIAGNOSIS — G47.33 OSA (OBSTRUCTIVE SLEEP APNEA): Primary | ICD-10-CM

## 2024-04-25 PROCEDURE — 99214 OFFICE O/P EST MOD 30 MIN: CPT | Performed by: INTERNAL MEDICINE

## 2024-04-25 RX ORDER — LAMOTRIGINE 100 MG/1
100 TABLET ORAL DAILY
COMMUNITY
Start: 2024-04-08

## 2024-04-25 NOTE — PROGRESS NOTES
Referring Physician  Dustin Patel MD    History of Present Illness  Patient presents today for management of underlying sleep apnea.  Admits to worsening hypersomnia and fatigue despite using CPAP device.  In process of having further workup done for surgery with ENT for nasal obstruction.  Underlying history of schizophrenia on Abilify and lamotrigine.    Medications  Current Outpatient Medications on File Prior to Visit   Medication Sig Dispense Refill    lamoTRIgine 100 MG Oral Tab Take 1 tablet (100 mg total) by mouth daily.      AZELASTINE 0.1 % Nasal Solution USE 2 SPRAYS IN EACH NOSTRIL TWICE DAILY 90 mL 0    ARIPiprazole 15 MG Oral Tab Take 1 tablet (15 mg total) by mouth daily.      levocetirizine (XYZAL) 5 MG Oral Tab Take 1 tablet (5 mg total) by mouth nightly. 30 tablet 0    montelukast (SINGULAIR) 10 MG Oral Tab Take 1 tablet (10 mg total) by mouth nightly. 90 tablet 0    OXcarbazepine 300 MG Oral Tab Take 300 mg by mouth 2 (two) times daily. (Patient not taking: Reported on 3/6/2024)      fluticasone propionate 50 MCG/ACT Nasal Suspension 2 sprays by Each Nare route daily. 18.2 mL 1    Sertraline HCl 100 MG Oral Tab Take 100 mg by mouth daily. (Patient not taking: Reported on 3/6/2024)       No current facility-administered medications on file prior to visit.       Allergies  No Known Allergies    Physical Exam  Constitutional: no acute distress  HEENT: PERRL  Neck: supple, no JVD  Cardio: RRR, S1 S2  Respiratory: clear to auscultation bilaterally, no wheezing, rales, rhonchi, crackles  GI: abdomen soft, non tender, active bowel sounds, no organomegaly  Extremities: no clubbing, cyanosis, edema  Neurologic: no gross motor deficits  Skin: warm, dry  Lymphatic: no supraclavicular lymphadenopathy     Assessment  1.  NORMA  2.  Schizophrenia  3.  Nasal obstruction    Plan  -Patient presents today for evaluation management of underlying sleep apnea.  I reviewed his download data over last 30 days with  usage 90% of days average usage 6 hours and 31 minutes.  AHI is 3.2.  Admits to some ongoing worsening hypersomnia and fatigue despite compliance and efficacy with CPAP device.  Advised may continue using CPAP device.  Patient in process of arranging for upcoming nasal surgery with ENT.  Inquiring about further management of hypersomnia.  May have component of excessive daytime sleepiness.  Also complicated given underlying history of schizophrenia on lamotrigine and Abilify.  Patient provided as feels psychiatrist office information [phone].  I will attempt to reach out to his office to see if he may be a candidate for any stimulant therapy.    Rufino Limon DO  Pulmonary Critical Care Medicine  [facility]  4/25/2024  1:08 PM

## 2024-05-03 ENCOUNTER — HOSPITAL ENCOUNTER (OUTPATIENT)
Dept: CT IMAGING | Facility: HOSPITAL | Age: 34
Discharge: HOME | End: 2024-05-03
Attending: STUDENT IN AN ORGANIZED HEALTH CARE EDUCATION/TRAINING PROGRAM
Payer: MEDICAID

## 2024-05-03 DIAGNOSIS — J34.89 NASAL OBSTRUCTION: ICD-10-CM

## 2024-09-13 ENCOUNTER — OFFICE VISIT (OUTPATIENT)
Dept: PULMONOLOGY | Facility: CLINIC | Age: 34
End: 2024-09-13

## 2024-09-13 VITALS
HEIGHT: 73 IN | SYSTOLIC BLOOD PRESSURE: 135 MMHG | OXYGEN SATURATION: 94 % | DIASTOLIC BLOOD PRESSURE: 70 MMHG | WEIGHT: 219 LBS | HEART RATE: 53 BPM | BODY MASS INDEX: 29.03 KG/M2

## 2024-09-13 DIAGNOSIS — G47.33 OSA (OBSTRUCTIVE SLEEP APNEA): Primary | ICD-10-CM

## 2024-09-13 PROCEDURE — 99213 OFFICE O/P EST LOW 20 MIN: CPT | Performed by: PHYSICIAN ASSISTANT

## 2024-09-13 RX ORDER — DIVALPROEX SODIUM 125 MG/1
125 TABLET, DELAYED RELEASE ORAL 3 TIMES DAILY
COMMUNITY

## 2024-09-13 NOTE — PROGRESS NOTES
Pulmonary Progress Note    History of Present Illness:  Maurilio Peterson is a 34 year old male presenting to pulmonary clinic today for follow-up of NORMA. He has mild NORMA and is on APAP 5-10 CWP. Data download demonstrates average daily usage of 6 hours and 36 minutes with residual respiratory events of 1.5/h. He has difficulty tolerating CPAP due to chronic nasal congestion. He has had two sinus procedures in the last 1.5 years and is now seeing ENT here with plans for possible septoplasty. He uses nasal CPAP mask. He does note improvement in daytime sleepiness with CPAP use, however, still feels tired in afternoon and takes a nap. He was on a mood stabilizer a few months ago which was contributing to daytime sleepiness and there has been improvement with discontinuation. No drowsy driving.    Review of Systems:   Constitutional: No weight loss or weight gain.  HEENT: +Chronic nasal congestion.  Cardio: No chest pain.  Respiratory: No shortness of breath.  GI: No acid reflux.  Extremities: +Diffuse myalgias.  Neurologic: No headache.  Psych: +Depression.     Physical Exam:  /70   Pulse 53   Ht 6' 1\" (1.854 m)   Wt 219 lb (99.3 kg)   SpO2 94%   BMI 28.89 kg/m²    Constitutional: No acute distress.  HEENT: Head NC/AT. PEERL. No tonsillar or uvula enlargement. Mallampati class 2.  Cardio: Regular rate and rhythm. Normal S1 and S2. No murmurs.   Respiratory: Thorax symmetrical with no labored breathing. Clear to ausculation bilaterally with symmetrical breath sounds. No wheezing, rhonchi, or crackles.   Extremities: No clubbing or cyanosis. No LE edema.  Neurologic: A&Ox3. No gross motor deficits.  Skin: Warm, dry.  Psych: Pleasant affect. Cooperative.    Results:  -Home sleep study 6/2021: Mild NORMA with combined AHI 13.0    Assessment/Plan:  NORMA - Mild NORMA with excessive daytime sleepiness complicated by depression/schizophrenia. Data download demonstrates excellent compliance and efficacy, however, he has  some difficulty tolerating due nasal obstruction. We discussed options including mandibular advancement therapy, trial of full face mask, and continuing with CPAP until he has nasal surgery which may help with tolerance. He is agreeable to trial of full face mask. Will consider mandibular advancement therapy is problems persist.  Plan:  -Vigilance with CPAP every night all night  -Trial of full face mask  -Follow up with ENT regarding chronic nasal obstruction  -Weight loss  -Avoid alcohol and sedating drugs  -Never drive if sleepy  -Follow up with psychiatry for depression/schizophrenia  -Follow-up at the 1-year interval again with download of data  -Call if new problems in the interim    Pasha Rogers PA-C  Pulmonary Medicine  9/13/2024

## 2024-09-16 ENCOUNTER — OFFICE VISIT (OUTPATIENT)
Dept: INTERNAL MEDICINE CLINIC | Facility: CLINIC | Age: 34
End: 2024-09-16
Payer: MEDICAID

## 2024-09-16 ENCOUNTER — LAB ENCOUNTER (OUTPATIENT)
Dept: LAB | Facility: HOSPITAL | Age: 34
End: 2024-09-16
Attending: INTERNAL MEDICINE
Payer: MEDICAID

## 2024-09-16 VITALS
SYSTOLIC BLOOD PRESSURE: 132 MMHG | OXYGEN SATURATION: 97 % | HEIGHT: 73 IN | DIASTOLIC BLOOD PRESSURE: 72 MMHG | BODY MASS INDEX: 29.29 KG/M2 | WEIGHT: 221 LBS | HEART RATE: 69 BPM

## 2024-09-16 DIAGNOSIS — Z00.00 ANNUAL PHYSICAL EXAM: ICD-10-CM

## 2024-09-16 DIAGNOSIS — F25.9 SCHIZOAFFECTIVE DISORDER, UNSPECIFIED TYPE (HCC): ICD-10-CM

## 2024-09-16 DIAGNOSIS — Z76.89 ESTABLISHING CARE WITH NEW DOCTOR, ENCOUNTER FOR: Primary | ICD-10-CM

## 2024-09-16 DIAGNOSIS — M79.10 MUSCLE PAIN: ICD-10-CM

## 2024-09-16 DIAGNOSIS — G47.33 OBSTRUCTIVE SLEEP APNEA: ICD-10-CM

## 2024-09-16 PROBLEM — F20.9 SCHIZOPHRENIA (HCC): Status: ACTIVE | Noted: 2017-05-05

## 2024-09-16 LAB
CHOLEST SERPL-MCNC: 176 MG/DL (ref ?–200)
CK SERPL-CCNC: 148 U/L
EST. AVERAGE GLUCOSE BLD GHB EST-MCNC: 114 MG/DL (ref 68–126)
FASTING PATIENT LIPID ANSWER: NO
HBA1C MFR BLD: 5.6 % (ref ?–5.7)
HDLC SERPL-MCNC: 40 MG/DL (ref 40–59)
LDLC SERPL CALC-MCNC: 116 MG/DL (ref ?–100)
NONHDLC SERPL-MCNC: 136 MG/DL (ref ?–130)
TRIGL SERPL-MCNC: 111 MG/DL (ref 30–149)
VLDLC SERPL CALC-MCNC: 19 MG/DL (ref 0–30)

## 2024-09-16 PROCEDURE — 83036 HEMOGLOBIN GLYCOSYLATED A1C: CPT

## 2024-09-16 PROCEDURE — 82550 ASSAY OF CK (CPK): CPT

## 2024-09-16 PROCEDURE — 36415 COLL VENOUS BLD VENIPUNCTURE: CPT | Performed by: INTERNAL MEDICINE

## 2024-09-16 PROCEDURE — 80061 LIPID PANEL: CPT | Performed by: INTERNAL MEDICINE

## 2024-09-16 NOTE — PROGRESS NOTES
[facility] part of [facility]  New Patient History and Physical      HPI:     Chief Complaint   Patient presents with    Physical     Annual physical   Pt is requesting Blood work wants to check cholesterol and glucose   Pt is not fasting      Maurilio Peterson is a 34 year old male with Schizoaffective bipolar, NORMA  presenting for an annual physical.     Schizophrenia ->Schizoaffective bipolar type: Was diagnosed with Schizophrenia 7 years ago, and then changed diagnosis 6 months ago. Gets regular blood work for medication side effects, has been on depakote (a few months, muscle pain) and abilify (3+ years), but currently tapering. Genetic testing was done and was told regimen will be changing. Has been reducing the medications, reports intermittent depression. Can't tell when he's manic. Denies any hallucinations, delusions, or paranoia. Follows with [facility]   Mild NORMA: wears a CPAP for the last 2-3 years, reports improvement to daytime sleepiness, but reports sleep at night has not been good. His muscle pain is constant and he tries to sleep it off during the day, affecting his ability to sleep at night. Had some sleepiness with medications prior to Depakote.  Has had 2 sinus procedures in the last 1.5 years and is now seeing ENT here with plans for possible septoplasty.     Denies any fevers, chills, chest pain, SOB. Denies any sick contacts. Takes OTC ibuprofen or Advil for headaches. Reports that he has been constantly been tired, has been up today since midnight. Using the CPAP helps him sleep.     Previously told he has high cholesterol. Drinks 4 large beers upto 5 days a weeks \"sometimes less, sometimes more\". Has done CBD and e-vaps in the past to see if it helps with schizoaffective symptoms with limited improvement, but the THC made him paranoid. Denies any cigarette use. 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 Does not work out.     FH: Dad: T2DM, HTN, schizophrenia. Has 3 siblings, believes they have high cholesterol, unsure as they do not talk. Grandfather had cancer, unsure what kind at an older age.     Labs:   Complete Metabolic Panel:  Lab Results   Component Value Date/Time     03/06/2024 07:37 PM    K 4.0 03/06/2024 07:37 PM     03/06/2024 07:37 PM    CO2 26.0 03/06/2024 07:37 PM    CREATSERUM 1.04 03/06/2024 07:37 PM    CA 9.7 03/06/2024 07:37 PM     (H) 03/06/2024 07:37 PM    TP 7.6 03/06/2024 07:37 PM    ALB 4.8 03/06/2024 07:37 PM    ALKPHO 77 03/06/2024 07:37 PM    AST 17 03/06/2024 07:37 PM    ALT 15 03/06/2024 07:37 PM    BILT 0.3 03/06/2024 07:37 PM    TSH 0.956 08/26/2022 12:54 PM        CBC:  Lab Results   Component Value Date    WBC 10.9 03/06/2024    HGB 13.5 03/06/2024    HCT 40.1 03/06/2024    .0 03/06/2024    NEPERCENT 52.7 03/06/2024    LYPERCENT 34.1 03/06/2024    MOPERCENT 6.1 03/06/2024    EOPERCENT 6.6 03/06/2024    BAPERCENT 0.3 03/06/2024    NE 5.72 03/06/2024    LYMABS 3.70 03/06/2024    MOABSO 0.66 03/06/2024    EOABSO 0.72 (H) 03/06/2024    BAABSO 0.03 03/06/2024            Hemoglobin A1C, Microalbumin  No results found for: \"A1C\"     Lipid panel  No results found for: \"CHOLEST\", \"HDL\", \"TRIG\", \"LDL\", \"NONHDLC\"  The ASCVD Risk score (Erica DK, et al., 2019) failed to calculate for the following reasons:    The 2019 ASCVD risk score is only valid for ages 40 to 79       Medications:  Current Outpatient Medications   Medication Sig Dispense Refill    carbamide peroxide 6.5 % Otic Solution Place 5 drops into both ears 2 (two) times daily. 1 each 1    divalproex  MG Oral Tab EC DR tab Take 1 tablet (125 mg total) by mouth 3 (three) times daily.      ARIPiprazole 15 MG Oral Tab Take 1 tablet (15 mg total) by mouth daily.        PMH:  Past Medical History:    Schizophrenia (HCC)         PSH:  History reviewed. No pertinent surgical  history.    Allergies:  Allergies   Allergen Reactions    Dust Mites UNKNOWN    Pollen Extract UNKNOWN      Social History:  Social History     Socioeconomic History    Marital status: Single   Tobacco Use    Smoking status: Never    Smokeless tobacco: Never   Vaping Use    Vaping status: Former    Start date: 9/13/2023    Substances: CBD   Substance and Sexual Activity    Alcohol use: Yes     Comment: occ'l use    Drug use: Never        Family History:  Family History   Problem Relation Age of Onset    No Known Problems Mother     Heart Disease Father     Schizophrenia Father           REVIEW OF SYSTEMS:   Pertinent symptoms per HPI     PHYSICAL EXAM:   /72   Pulse 69   Ht 6' 1\" (1.854 m)   Wt 221 lb (100.2 kg)   SpO2 97%   BMI 29.16 kg/m²  Estimated body mass index is 29.16 kg/m² as calculated from the following:    Height as of this encounter: 6' 1\" (1.854 m).    Weight as of this encounter: 221 lb (100.2 kg).     Wt Readings from Last 3 Encounters:   09/16/24 221 lb (100.2 kg)   09/13/24 219 lb (99.3 kg)   04/25/24 205 lb (93 kg)       Physical Exam  Constitutional:       Appearance: Normal appearance. He is not ill-appearing.   HENT:      Head: Atraumatic.      Ears:      Comments: Ear wax present  Eyes:      Extraocular Movements: Extraocular movements intact.   Cardiovascular:      Rate and Rhythm: Normal rate and regular rhythm.   Pulmonary:      Effort: Pulmonary effort is normal.   Abdominal:      General: There is distension.      Palpations: Abdomen is soft.   Musculoskeletal:      Right lower leg: No edema.      Left lower leg: No edema.   Neurological:      General: No focal deficit present.      Mental Status: He is alert and oriented to person, place, and time.       ASSESSMENT AND PLAN:   Patient is a 34 year old male who presents primarily presents for:    (Z76.89) Establishing care with new doctor, encounter for  (primary encounter diagnosis)   Extensive time spent reviewing history  including past medical history, social history, family history, surgical hx, allergies.  Medication list reviewed.  Past diagnostic analysis and imaging reviewed if present.      (Z00.00) Annual physical exam  Plan: Hemoglobin A1C (Glycohemoglobin) [E], Lipid         Panel         During the annual physical exam I spent extensive time discussing medical history including existing conditions, past surgeries, allergies and medications.  Lifestyle factors discussed including diet, exercise and substance abuse including alcohol and tobacco if warranted.  Counseled on screening tests based on age and underlying risk factors which may include but not limited to blood pressure measurement, cholesterol screening, diabetes screening and prostate cancer screening.  Preventative screenings discussed.  Reviewed immunizations.  Sexual health discussed if appropriate.  Family history reviewed.  I addressed any specific concerns or symptoms that the patient had.      (M79.10) Muscle pain  Plan: CK (Creatine Kinase) (Not Creatinine) [E]        Likely due to side effect from medications for schizoaffective do.  Will check CK.      (F25.9) Schizoaffective disorder, unspecified type (HCC)  Plan: Following with neurology.  On abilify and depakote.      (G47.33) Obstructive sleep apnea  Plan: Following with Pulmonology and ENT.            Health Maintenance:  Health Maintenance   Topic Date Due    Annual Physical  Never done    DTaP,Tdap,and Td Vaccines (1 - Tdap) Never done    Annual Depression Screening  Never done    COVID-19 Vaccine (2 - 2023-24 season) 09/01/2024    Influenza Vaccine (1) 10/01/2024    Pneumococcal Vaccine: Birth to 64yrs  Aged Out       Meds & Refills for this Visit:  Requested Prescriptions     Signed Prescriptions Disp Refills    carbamide peroxide 6.5 % Otic Solution 1 each 1     Sig: Place 5 drops into both ears 2 (two) times daily.       Orders Placed This Encounter   Procedures    Hemoglobin A1C  (Glycohemoglobin) [E]    Lipid Panel    CK (Creatine Kinase) (Not Creatinine) [E]    TdaP (Adacel, Boostrix) [42223]     Imaging & Consults:  TETANUS, DIPHTHERIA TOXOIDS AND ACELLULAR PERTUSIS VACCINE (TDAP), >7 YEARS, IM USE    Discussed with patient to continue following with Psychiatrist regarding medication regimen. Recommended continuing to follow with Pulm and ENT. Ordered ear drops to loosen the ear wax. Ordering labs to evaluate for hyperlipidemia, diabetes. Ordering CK given muscle pain thought to be in setting of psychiatric medications. TDAP vaccine given.     RTC in 6 months    Dustin Patel MD     Return in about 6 months (around 3/16/2025) for Symptom monitoring.  Important issues to follow up on next visit  - Psychiatric medication regimen   - Weight management     Patient indicates understanding of the above recommendations and agrees to the above plan.  Reasurrance and education provided. All questions answered.  Notified to call with any questions, complications, allergies, or worsening or changing symptoms as well as any side effects or complications from the treatments .  Red flags/ ER precautions discussed.    This note was produced using voice recognition software.  As a result, errors may occur.  When identified, these errors have been corrected.  While every attempt is made to correct errors during dictation, errors may still exist.    Total time spent was 50 minutes which includes: Preparation to see patient including chart review, reviewing appropriate medical history, counseling and education (diet and exercise), discussing treatment options, ordering appropriate diagnostic tests and documentation.          Dustin Patel MD  Internal Medicine/Primary Care  [facility]

## 2024-09-23 ENCOUNTER — HOSPITAL ENCOUNTER (OUTPATIENT)
Dept: CT IMAGING | Facility: HOSPITAL | Age: 34
Discharge: HOME | End: 2024-09-23
Attending: STUDENT IN AN ORGANIZED HEALTH CARE EDUCATION/TRAINING PROGRAM
Payer: MEDICAID

## 2024-09-23 PROCEDURE — 70486 CT MAXILLOFACIAL W/O DYE: CPT | Performed by: STUDENT IN AN ORGANIZED HEALTH CARE EDUCATION/TRAINING PROGRAM

## 2024-09-30 ENCOUNTER — OFFICE VISIT (OUTPATIENT)
Dept: OTOLARYNGOLOGY | Facility: CLINIC | Age: 34
End: 2024-09-30
Payer: MEDICAID

## 2024-09-30 VITALS — WEIGHT: 221 LBS | HEIGHT: 73 IN | BODY MASS INDEX: 29.29 KG/M2

## 2024-09-30 DIAGNOSIS — G47.33 OSA (OBSTRUCTIVE SLEEP APNEA): Primary | ICD-10-CM

## 2024-09-30 DIAGNOSIS — M95.0 NASAL VALVE COLLAPSE: ICD-10-CM

## 2024-09-30 DIAGNOSIS — J34.89 NASAL OBSTRUCTION: ICD-10-CM

## 2024-09-30 DIAGNOSIS — J34.3 HYPERTROPHY OF BOTH INFERIOR NASAL TURBINATES: ICD-10-CM

## 2024-09-30 DIAGNOSIS — J34.2 NASAL SEPTAL DEVIATION: ICD-10-CM

## 2024-09-30 PROCEDURE — 31231 NASAL ENDOSCOPY DX: CPT | Performed by: STUDENT IN AN ORGANIZED HEALTH CARE EDUCATION/TRAINING PROGRAM

## 2024-09-30 PROCEDURE — 99215 OFFICE O/P EST HI 40 MIN: CPT | Performed by: STUDENT IN AN ORGANIZED HEALTH CARE EDUCATION/TRAINING PROGRAM

## 2024-09-30 RX ORDER — CARBAMAZEPINE 200 MG/1
TABLET ORAL
COMMUNITY
Start: 2024-09-25

## 2024-09-30 RX ORDER — OLANZAPINE 5 MG/1
TABLET ORAL
COMMUNITY
Start: 2024-09-22

## 2024-09-30 NOTE — PROGRESS NOTES
Maurilio Peterson is a 34 year old male.   Chief Complaint   Patient presents with    Results     Patient is here for CT Sinus results.     HPI:   34-year-old with a history of schizophrenia and CPAP use for his obstructive sleep apnea.  Intolerance to CPAP due to nasal obstruction.  Is not sleeping well because of this reports that he had a septoplasty in the past as well as a procedure such as a balloon sinuplasty.  He has been using oral antihistamine such as Zyrtec and Astelin nasal spray for greater than 3 months without relief of his symptoms    Current Outpatient Medications   Medication Sig Dispense Refill    carBAMazepine 200 MG Oral Tab       carBAMazepine ER, Mood, 200 MG Oral Capsule SR 12 Hr       OLANZapine 5 MG Oral Tab       carbamide peroxide 6.5 % Otic Solution Place 5 drops into both ears 2 (two) times daily. 1 each 1    divalproex  MG Oral Tab EC DR tab Take 1 tablet (125 mg total) by mouth 3 (three) times daily.      ARIPiprazole 15 MG Oral Tab Take 1 tablet (15 mg total) by mouth daily.        Past Medical History:    Schizophrenia (HCC)      Social History:  Social History     Socioeconomic History    Marital status: Single   Tobacco Use    Smoking status: Never    Smokeless tobacco: Never   Vaping Use    Vaping status: Former    Start date: 9/13/2023    Substances: CBD   Substance and Sexual Activity    Alcohol use: Yes     Comment: occ'l use    Drug use: Never      History reviewed. No pertinent surgical history.      EXAM:   Ht 6' 1\" (1.854 m)   Wt 221 lb (100.2 kg)   BMI 29.16 kg/m²     System Details   Skin Inspection - Normal.   Constitutional Overall appearance - Normal.   Head/Face Symmetric, TMJ tenderness not present    Eyes EOMI, PERRL   Right ear:  Canal clear, TM intact, no JETT   Left ear:  Canal clear, TM intact, no JETT   Nose: Septum deviated to the right anteriorly and left more posteriorly, inferior turbinates enlarged, nasal valves with collapse on each side that respond  to the modified Barnwell maneuver   Oral cavity/Oropharynx: No lesions or masses on inspection or palpation, tonsils symmetric    Neck: Soft without LAD, thyroid not enlarged  Voice clear/ no stridor   Other:      SCOPES AND PROCEDURES:     Nasal Endoscopy Procedure Note     Due to inability for adequate examination of the nose and nasopharynx and need for magnification to perform the examination, endoscopy was performed.  Risks and benefits were discussed with patient/family and they have given verbal consent to proceed.    Pre-operative Diagnosis:   1. NORMA (obstructive sleep apnea)    2. Nasal obstruction    3. Hypertrophy of both inferior nasal turbinates    4. Nasal septal deviation    5. Nasal valve collapse        Post-operative Diagnosis: Same    Procedure: Diagnostic nasal endoscopy    Anesthesia: Topical anesthetic [place]     Surgeon Gustavo Crystal MD    EBL: 0cc    Procedure Detail & Findings:     After placement of topical anesthetic intranasally the endoscope was inserted into each nares and driven through the nasal cavity into the nasopharynx. The following findings were noted:    Septum: Septum deviated to the right anteriorly and left more posteriorly, inferior turbinates enlarged  Middle meatus: Patent  Middle turbinates: Normal  Purulence: None noted  Polyps: None noted  Nasopharynx and eustachian tube: No masses  Other: The middle and superior meatus, the turbinates, and the spheno-ethmoid recess were inspected and seen to be without significant abnormal findings.     Condition: Stable    Complications: Patient tolerated the procedure well with no immediate complication.    Gustavo Crystal MD    AUDIOGRAM AND IMAGING:     CT sinus  CONCLUSION:   1. Small dependent maxillary sinus retention cysts bilaterally.  Paranasal sinuses are otherwise well aerated.  No CT evidence of acute or chronic sinusitis.   2. Patent paranasal sinus drainage pathways.   3. Mild-to-moderate leftward deviation of the nasal  septum with a small leftward directed anterior septal spur.   4. Soft tissue density in both external auditory canals presumably relates to cerumen.   5. Lesser incidental findings as above.     IMPRESSION:   1. NORMA (obstructive sleep apnea)    2. Nasal obstruction    3. Hypertrophy of both inferior nasal turbinates    4. Nasal septal deviation    5. Nasal valve collapse       Recommendations:  -Given his history of schizophrenia, sleep apnea not tolerant of CPAP due to nasal obstruction, and lack response to Astelin and Zyrtec for greater than 3 months and CT findings and exam with a septal deviation we will pursue septoplasty and turbinate reduction.  Discussed the increased difficulty and rate of complications or failure of persistent obstruction given his history of a previous septoplasty.  Discussed that his septal deviation is mild to moderate and may not have complete relief of his obstruction.  May also be a nasal valve or allergic component.  -His psychiatrist has recommended no opioids after the surgery.  He has had anesthesia before  -Will pursue in the near future    The patient meets indications for septoplasty and inferior turbinate reduction. These procedures were discussed including risks, benefits and alternatives. Risks include bleeding, infection, septal perforation, CSF leak, risks of general anesthesia, need for additional procedures and persistent symptoms. They understand, all questions were answered and would like to proceed.     I discussed the etiology and anatomical obstructions related to their nasal obstruction regarding their nasal valve collapse. I discussed surgical correction of this with repair of their nasal valve via a lateral crural J flap. This procedure was discussed including risks, benefits and alternatives. Risks include bleeding, infection, need for additional procedures, persistent symptoms, risks of general anesthesia and cosmetic alterations. They understand, all  questions were answered and would like to proceed.     The patient indicates understanding of these issues and agrees to the plan.  Greater than 40 minutes was spent with the patient, reviewing imaging, coordinating care including his surgery, documentation and discussion not including the nasal endoscopy portion    Gustavo Crystal MD  9/30/2024  12:12 PM

## 2024-10-07 ENCOUNTER — PATIENT MESSAGE (OUTPATIENT)
Dept: PULMONOLOGY | Facility: CLINIC | Age: 34
End: 2024-10-07

## 2024-10-07 ENCOUNTER — TELEPHONE (OUTPATIENT)
Dept: PULMONOLOGY | Facility: CLINIC | Age: 34
End: 2024-10-07

## 2024-10-07 NOTE — TELEPHONE ENCOUNTER
Patient calling states was advised by psychiatrist that it is ok to take a stimulant with other medications. Please call and advise. (See patient message from 10/7/24)

## 2024-10-07 NOTE — TELEPHONE ENCOUNTER
From: Maurilio Peterson  To: Pasha Rogers  Sent: 10/7/2024 10:50 AM CDT  Subject: Bad Sleep    Hi,  I saw Dr. Crystal to talk about my deviated septum and he didn’t seem to think it would help much with the CPAP because it is not that deviated.     My Psychiatrist quit last week, but she said to try a sleeping medicine instead of the stimulants. My next appointment with a psychiatrist from [facility] is in November.    Can I do any tests or anything to determine what is causing the sleep issues?

## 2024-10-28 ENCOUNTER — PATIENT MESSAGE (OUTPATIENT)
Dept: PULMONOLOGY | Facility: CLINIC | Age: 34
End: 2024-10-28

## 2024-10-29 ENCOUNTER — TELEPHONE (OUTPATIENT)
Dept: OTOLARYNGOLOGY | Facility: CLINIC | Age: 34
End: 2024-10-29

## 2024-11-12 DIAGNOSIS — G47.10 HYPERSOMNIA: ICD-10-CM

## 2024-11-13 RX ORDER — MODAFINIL 100 MG/1
100 TABLET ORAL EVERY MORNING
Qty: 30 TABLET | Refills: 0 | Status: CANCELLED | OUTPATIENT
Start: 2025-01-13

## 2024-11-13 RX ORDER — MODAFINIL 100 MG/1
100 TABLET ORAL EVERY MORNING
Qty: 30 TABLET | Refills: 0 | Status: CANCELLED | OUTPATIENT
Start: 2024-12-13

## 2024-11-13 RX ORDER — MODAFINIL 100 MG/1
100 TABLET ORAL EVERY MORNING
Qty: 30 TABLET | Refills: 2 | Status: SHIPPED | OUTPATIENT
Start: 2024-11-13

## 2024-11-13 NOTE — TELEPHONE ENCOUNTER
Pasha NAILS- please sign pended order for Modafinil, if agreeable    Last office visit: 9/1/2024  Last refill: 10/14/2024

## 2025-01-10 ENCOUNTER — PATIENT MESSAGE (OUTPATIENT)
Dept: PULMONOLOGY | Facility: CLINIC | Age: 35
End: 2025-01-10

## 2025-01-10 DIAGNOSIS — G47.33 OSA (OBSTRUCTIVE SLEEP APNEA): Primary | ICD-10-CM

## 2025-01-22 ENCOUNTER — TELEPHONE (OUTPATIENT)
Dept: PULMONOLOGY | Facility: CLINIC | Age: 35
End: 2025-01-22

## 2025-01-22 NOTE — TELEPHONE ENCOUNTER
Patient states he needs the settings changed for the CPAP because he is having issues sleeping, please follow up it is urgent

## 2025-01-23 ENCOUNTER — PATIENT MESSAGE (OUTPATIENT)
Dept: INTERNAL MEDICINE CLINIC | Facility: CLINIC | Age: 35
End: 2025-01-23

## 2025-01-23 ENCOUNTER — TELEPHONE (OUTPATIENT)
Dept: PULMONOLOGY | Facility: CLINIC | Age: 35
End: 2025-01-23

## 2025-01-23 DIAGNOSIS — G47.33 OSA (OBSTRUCTIVE SLEEP APNEA): Primary | ICD-10-CM

## 2025-01-23 NOTE — TELEPHONE ENCOUNTER
Patient states that he has lost 25 lbs and need to get auto cpap settings changed to a lower pressure, as he is not able to tolerate current setting 5-10cwp. Patient wants to know if he is able to get an order to get a oral appliance and a medical bed.

## 2025-01-23 NOTE — TELEPHONE ENCOUNTER
Pasha NAILS-please see message below. Patient reports pressure is too high to tolerate.  Download generated and placed in Pasha's folder for review.

## 2025-01-23 NOTE — TELEPHONE ENCOUNTER
Data download reviewed. Avg usage is 5 hours and 49 min with residual respiratory events of 3.2/h on APAP 5-10 CWP.     RN: Please adjust max pressure to 8 CWP from 10 CWP. You may also refer patient to [facility] (Bianca Jimenez) if he wishes to pursue oral appliance therapy.

## 2025-01-24 ENCOUNTER — TELEPHONE (OUTPATIENT)
Dept: PULMONOLOGY | Facility: CLINIC | Age: 35
End: 2025-01-24

## 2025-01-27 ENCOUNTER — TELEPHONE (OUTPATIENT)
Dept: PULMONOLOGY | Facility: CLINIC | Age: 35
End: 2025-01-27

## 2025-01-27 NOTE — TELEPHONE ENCOUNTER
Patient transferred for psychiatric admission. [place] emergency department diagnoses Paranoid delusion and poor sleep.

## 2025-01-27 NOTE — TELEPHONE ENCOUNTER
Noted  
Patient is still having issues with sleep and thinks it has to do with Cpap settings.  Please call.  Thank you.  
Spoke with patient, requesting appointment with Pasha CASTRO as soon as possible due to CPAP not working for him.  States he sleeps 20 minutes on and off, pressure was lowered yesterday to 8 cwp, states sleep is same as it was before.  Appointment scheduled with Pasha CASTRO on 1/30/25 at 9:30am, verified date and time, patient verbalized understanding.  
[FreeTextEntry1] : Recommend antibiotics, nasal saline, and guaifenesin. Return if symptoms worsen or persist.\par

## 2025-01-27 NOTE — TELEPHONE ENCOUNTER
Faxed visit note and sleep study to Dr Jimenez, as requested by Pasha NAILS. Confirmation received.

## 2025-02-06 ENCOUNTER — PATIENT MESSAGE (OUTPATIENT)
Dept: INTERNAL MEDICINE CLINIC | Facility: CLINIC | Age: 35
End: 2025-02-06

## 2025-02-11 NOTE — TELEPHONE ENCOUNTER
Spoke with patient, advise him to contact each facility and signed a medical release form allowing us to have access or to obtain copies for every evaluation he had. Patient will follow up with them.

## 2025-02-18 ENCOUNTER — TELEPHONE (OUTPATIENT)
Dept: INTERNAL MEDICINE CLINIC | Facility: CLINIC | Age: 35
End: 2025-02-18

## 2025-02-18 NOTE — TELEPHONE ENCOUNTER
Patient came to [facility] office 2/17/25 to drop off envelope with his medical records for Dr. Patel.  Forwarded to Radha KELLER)

## 2025-02-24 ENCOUNTER — TELEPHONE (OUTPATIENT)
Age: 35
End: 2025-02-24

## 2025-02-24 NOTE — TELEPHONE ENCOUNTER
Pt called stating that he called his ins and he is qualified to receive a medical bed due to his severe sleep apnea, but he needs a order from doctor to be sent   Pt wants to know if possible to send out this order today    Pt aware pcp is out of office    Please advise

## 2025-02-24 NOTE — TELEPHONE ENCOUNTER
[employer] message sent to pt to explain that Dr Patel is NOT the provider that would be placing a hospital bed / DME order for him bc the reason pt believes he needs the bed is unusual- sleep apnea, and Dr Patel is not treating that condition.  Pt needs to call pulmonary/ FANTA GALVEZ regarding this type of order since it is specifically related to the Sleep apnea she is treating.  PCP would have no medical necessity rationale or documentation to provide to the DME vendor/ [place] [employer] insurer to get bed authorized.

## 2025-03-06 ENCOUNTER — OFFICE VISIT (OUTPATIENT)
Age: 35
End: 2025-03-06
Payer: MEDICAID

## 2025-03-06 VITALS
OXYGEN SATURATION: 94 % | HEIGHT: 73 IN | BODY MASS INDEX: 25.89 KG/M2 | DIASTOLIC BLOOD PRESSURE: 70 MMHG | TEMPERATURE: 97 F | SYSTOLIC BLOOD PRESSURE: 120 MMHG | HEART RATE: 94 BPM | WEIGHT: 195.38 LBS

## 2025-03-06 DIAGNOSIS — F22 DELUSIONS (HCC): ICD-10-CM

## 2025-03-06 DIAGNOSIS — Z09 FOLLOW-UP EXAM: Primary | ICD-10-CM

## 2025-03-06 DIAGNOSIS — F25.9 SCHIZOAFFECTIVE DISORDER, UNSPECIFIED TYPE (HCC): ICD-10-CM

## 2025-03-06 PROCEDURE — 99214 OFFICE O/P EST MOD 30 MIN: CPT | Performed by: INTERNAL MEDICINE

## 2025-03-14 NOTE — PROGRESS NOTES
[facility] part of [facility]  Return Patient Progress Note      HPI:     Chief Complaint   Patient presents with    Follow - Up     I had 4 Psych hospitalizations in January. They dontrell a lot of blood and I want   to go over that info          Maurilio Peterson is a 34 year old male presenting for:    Has a significant  has a past medical history of Allergic rhinitis (Starts mid feb), Anxiety, Depression, Schizophrenia (HCC), and Sleep apnea (Diagnosed a few years ago).    Here for follow up.  Multiple admissions recently for delusions.  He is feeling better. Is following with psychiatry.      Labs:   CMP:  Lab Results   Component Value Date/Time     01/25/2025 03:15 AM    K 4.0 01/25/2025 03:15 AM     01/25/2025 03:15 AM    CO2 25.0 01/25/2025 03:15 AM    CREATSERUM 0.97 01/25/2025 03:15 AM    CA 9.8 01/25/2025 03:15 AM     (H) 01/25/2025 03:15 AM    TP 7.6 01/25/2025 03:15 AM    ALB 5.0 (H) 01/25/2025 03:15 AM    ALKPHO 108 01/25/2025 03:15 AM    AST 13 01/25/2025 03:15 AM    ALT 22 01/25/2025 03:15 AM    BILT 0.3 01/25/2025 03:15 AM    TSH 0.956 08/26/2022 12:54 PM        CBC:  Lab Results   Component Value Date    WBC 7.4 01/25/2025    HGB 14.8 01/25/2025    HCT 41.6 01/25/2025    .0 01/25/2025    NEPERCENT 55.6 01/25/2025    LYPERCENT 35.1 01/25/2025    MOPERCENT 7.0 01/25/2025    EOPERCENT 1.8 01/25/2025    BAPERCENT 0.4 01/25/2025    NE 4.12 01/25/2025    LYMABS 2.60 01/25/2025    MOABSO 0.52 01/25/2025    EOABSO 0.13 01/25/2025    BAABSO 0.03 01/25/2025          Hemoglobin A1C, Microalbumin  Lab Results   Component Value Date/Time    A1C 5.6 09/16/2024 03:10 PM        Lipid panel  Lab Results   Component Value Date/Time    CHOLEST 176 09/16/2024 03:10 PM    HDL 40 09/16/2024 03:10 PM    TRIG 111 09/16/2024 03:10 PM     (H) 09/16/2024 03:10 PM    NONHDLC 136 (H) 09/16/2024 03:10 PM        Medications:  No current outpatient medications on file.      PMH:  Past  Medical History:    Allergic rhinitis    Tree pollen and dust mites    Anxiety    Im Schizoaffective Bipolar type    Depression    Im Schizoaffective Bipolar type    Schizophrenia (HCC)    Sleep apnea               REVIEW OF SYSTEMS:   Review of Systems   Constitutional:  Negative for chills, fatigue, fever and unexpected weight change.   HENT:  Negative for congestion, ear pain, hearing loss, rhinorrhea, sinus pain and sore throat.    Eyes:  Negative for pain, redness and visual disturbance.   Respiratory:  Negative for apnea, cough, chest tightness, shortness of breath and wheezing.    Cardiovascular:  Negative for chest pain, palpitations and leg swelling.   Gastrointestinal:  Negative for abdominal distention, abdominal pain, blood in stool, constipation and nausea.   Endocrine: Negative for cold intolerance, heat intolerance and polyuria.   Genitourinary:  Negative for dysuria, hematuria and urgency.   Musculoskeletal:  Negative for arthralgias, back pain, gait problem, joint swelling, myalgias and neck pain.   Skin:  Negative for rash and wound.   Allergic/Immunologic: Negative for food allergies and immunocompromised state.   Neurological:  Negative for dizziness, seizures, facial asymmetry, speech difficulty, weakness, light-headedness, numbness and headaches.   Hematological:  Negative for adenopathy. Does not bruise/bleed easily.   Psychiatric/Behavioral:  Negative for behavioral problems, sleep disturbance and suicidal ideas. The patient is not nervous/anxious.             PHYSICAL EXAM:   /70   Pulse 94   Temp 97.4 °F (36.3 °C)   Ht 6' 1\" (1.854 m)   Wt 195 lb 6.4 oz (88.6 kg)   SpO2 94%   BMI 25.78 kg/m²  Estimated body mass index is 25.78 kg/m² as calculated from the following:    Height as of this encounter: 6' 1\" (1.854 m).    Weight as of this encounter: 195 lb 6.4 oz (88.6 kg).     Wt Readings from Last 3 Encounters:   03/06/25 195 lb 6.4 oz (88.6 kg)   01/25/25 197 lb (89.4 kg)    09/30/24 221 lb (100.2 kg)       Physical Exam  Vitals reviewed.   Constitutional:       General: He is not in acute distress.     Appearance: He is well-developed.   HENT:      Head: Normocephalic and atraumatic.   Eyes:      Conjunctiva/sclera: Conjunctivae normal.      Pupils: Pupils are equal, round, and reactive to light.   Neck:      Thyroid: No thyromegaly.   Cardiovascular:      Rate and Rhythm: Normal rate and regular rhythm.      Heart sounds: Normal heart sounds, S1 normal and S2 normal. No murmur heard.     No friction rub. No gallop.   Pulmonary:      Effort: Pulmonary effort is normal. No respiratory distress.      Breath sounds: Normal breath sounds. No wheezing or rales.   Chest:      Chest wall: No tenderness.   Abdominal:      General: Bowel sounds are normal. There is no distension.      Palpations: Abdomen is soft. There is no mass.      Tenderness: There is no abdominal tenderness. There is no guarding or rebound.   Musculoskeletal:         General: No tenderness. Normal range of motion.      Cervical back: Normal range of motion.   Lymphadenopathy:      Cervical: No cervical adenopathy.   Skin:     General: Skin is warm.      Findings: No erythema or rash.   Neurological:      Mental Status: He is alert and oriented to person, place, and time.      Cranial Nerves: No cranial nerve deficit.      Deep Tendon Reflexes: Reflexes are normal and symmetric.   Psychiatric:         Behavior: Behavior normal.         Thought Content: Thought content normal.         Judgment: Judgment normal.               ASSESSMENT AND PLAN:   Patient is a 34 year old male who presents primarily presents for:    (Z09) Follow-up exam  (primary encounter diagnosis)  (F25.9) Schizoaffective disorder, unspecified type (HCC)  Following with psychiatry.      (F22) Delusions (HCC)  As above.     Overall he is improving.  Still has some trouble sleeping.  Recommend he discuss with specialist regarding starting sleep aid.      Dr. Patel          Health Maintenance:    Health Maintenance   Topic Date Due    COVID-19 Vaccine (2 - 2024-25 season) 09/01/2024    Influenza Vaccine (1) 10/01/2024    Annual Depression Screening  Never done    Annual Physical  09/16/2025    DTaP,Tdap,and Td Vaccines (9 - Td or Tdap) 09/16/2034    Pneumococcal Vaccine: Birth to 50yrs  Aged Out    Meningococcal B Vaccine  Aged Out         Meds & Refills for this Visit:  Requested Prescriptions      No prescriptions requested or ordered in this encounter       No orders of the defined types were placed in this encounter.      Imaging & Consults:  None          Return in about 3 months (around 6/6/2025) for Symptom monitoring.  Important follow up notes/labs for next visit      Patient indicates understanding of the above recommendations and agrees to the above plan.  Reasurrance and education provided. All questions answered.    Notified to call with any questions, complications, allergies, or worsening or changing symptoms as well as any side effects or complications from the treatments .  Red flags/ ER precautions discussed.    If diagnostic labs or imaging ordered advised patient to contact my office for results  24-48 hours after completion    This note was dictated using dragon speech recognition transcription software.  Typographical and transcription errors may be present.  Please call if any questions.       As part of our commitment to providing you with comprehensive, transparent, and timely access to your health information, we adhere to the guidelines set forth by the 21st Century Cures Act. This Act enhances your rights to access your electronic health information and ensures that you can easily obtain your medical records.  Please note that the verbage used in this note is intended for medical documentation and communication and may be interpreted as forthright.  Please do not hesitate to contact my office if you have any questions.             Dustin Patel MD  [facility]

## 2025-03-25 ENCOUNTER — OFFICE VISIT (OUTPATIENT)
Dept: PULMONOLOGY | Facility: CLINIC | Age: 35
End: 2025-03-25
Payer: MEDICAID

## 2025-03-25 VITALS
DIASTOLIC BLOOD PRESSURE: 75 MMHG | HEART RATE: 75 BPM | OXYGEN SATURATION: 95 % | HEIGHT: 73 IN | WEIGHT: 195 LBS | BODY MASS INDEX: 25.84 KG/M2 | SYSTOLIC BLOOD PRESSURE: 133 MMHG

## 2025-03-25 DIAGNOSIS — G47.9 SLEEP DISTURBANCE: ICD-10-CM

## 2025-03-25 DIAGNOSIS — G47.33 OSA (OBSTRUCTIVE SLEEP APNEA): Primary | ICD-10-CM

## 2025-03-25 PROCEDURE — 99213 OFFICE O/P EST LOW 20 MIN: CPT | Performed by: PHYSICIAN ASSISTANT

## 2025-03-25 RX ORDER — HYDROXYZINE PAMOATE 25 MG/1
25 CAPSULE ORAL 3 TIMES DAILY PRN
COMMUNITY

## 2025-03-25 RX ORDER — ARIPIPRAZOLE LAUROXIL 882 MG/3.2ML
INJECTION, SUSPENSION, EXTENDED RELEASE INTRAMUSCULAR
COMMUNITY

## 2025-03-25 NOTE — PROGRESS NOTES
MARCIA faxed to [facility] for sleep study with confirmation received and forms sent for scanning.

## 2025-03-25 NOTE — PROGRESS NOTES
Pulmonary Progress Note    History of Present Illness:  Maurilio Peterson is a 34 year old male presenting to pulmonary clinic today for follow up. He has mild NORMA based on prior home sleep study from 2021 and is on APAP 5-8 CWP. He reports he had home sleep study done at [facility] in 2023 which showed severe NORMA. Data download demonstrates average daily usage of 6 hours and 52 minutes with residual respiratory events of 2.3/h. Although he uses CPAP nightly, he has frequent nocturnal awakenings and has to take CPAP on and off often. Sometimes he falls back to sleep without it. He states nocturnal awakenings are due to nightmares. He has sleep paralysis. He goes to bed between 5-7 PM, falls asleep promptly with hydroxyzine, and awakens at 3 AM. He feels rested when he wakes up and denies excessive daytime sleepiness. He rarely naps. He had manic episode earlier this year and was hospitalized for this. He has had multiple changes to his psychiatric medications. At one point he was on modafinil for excessive daytime sleepiness but he no longer requires. He is interested in mandibular advancement therapy and found a dentist near him that accepts his insurance ([facility]).     Medications: has a current medication list which includes the following prescription(s): aristada and hydroxyzine pamoate.    Review of Systems:   Constitutional: +Weight loss of 30 pounds in 6 months with medication changes. No fever or chills.   HEENT: +Chronic nasal congestion.  Cardio: No chest pain.  Respiratory: See HPI.  GI: No acid reflux.  Extremities: No lower extremity swelling.   Neurologic: No headache.  Psych: +Anxiety, delusions.    Physical Exam:  /75   Pulse 75   Ht 6' 1\" (1.854 m)   Wt 195 lb (88.5 kg)   SpO2 95%   BMI 25.73 kg/m²    Constitutional: No acute distress.   HEENT: Head NC/AT. PEERL. No tonsillar or uvula enlargement.   Cardio: Regular rate and rhythm. Normal S1 and S2. No murmurs.   Respiratory: Thorax  symmetrical with no labored breathing. Clear to ausculation bilaterally with symmetrical breath sounds. No wheezing, rhonchi, or crackles.   Extremities: No clubbing or cyanosis. No LE edema. No calf tenderness.  Neurologic: A&Ox3. No gross motor deficits.  Skin: Warm, dry.  Psych: Calm, cooperative. Pleasant affect.    Results:  Home sleep study 6/2021: Mild NORMA with combined AHI 13.0    Assessment/Plan:  NORMA  Mild NORMA on HST 2021. Reportedly had repeat HST at [facility] in 2023 which may have showed severe NORMA. We will request these records. He seems to be tolerating CPAP with good efficacy and compliance but he wants to try mandibular advancement therapy. He located a dentist that accepts his insurance for this.  Plan:  -We will request sleep study records from [facility] for review  -Continue CPAP nightly for now  -If patient pursues mandibular advancement therapy, will consider follow up HST    Sleep disturbances  NORMA is well treated. He has bipolar disorder/schizophrenia/PTSD which is contributing.   Plan:  -Follow up with psychiatrist    Pasha Rogers PA-C  Pulmonary Medicine  3/25/2025

## 2025-03-26 ENCOUNTER — TELEPHONE (OUTPATIENT)
Dept: PULMONOLOGY | Facility: CLINIC | Age: 35
End: 2025-03-26

## 2025-03-26 NOTE — TELEPHONE ENCOUNTER
Received medical records and HST from [facility]. Placed in Pasha PA- folder to review.

## 2025-03-27 ENCOUNTER — OFFICE VISIT (OUTPATIENT)
Age: 35
End: 2025-03-27
Payer: MEDICAID

## 2025-03-27 VITALS
HEART RATE: 118 BPM | SYSTOLIC BLOOD PRESSURE: 102 MMHG | DIASTOLIC BLOOD PRESSURE: 60 MMHG | WEIGHT: 202 LBS | TEMPERATURE: 97 F | OXYGEN SATURATION: 96 % | BODY MASS INDEX: 26.77 KG/M2 | HEIGHT: 73 IN

## 2025-03-27 DIAGNOSIS — R03.0 ELEVATED BP WITHOUT DIAGNOSIS OF HYPERTENSION: Primary | ICD-10-CM

## 2025-03-27 PROCEDURE — 99213 OFFICE O/P EST LOW 20 MIN: CPT | Performed by: INTERNAL MEDICINE

## 2025-03-27 RX ORDER — OLANZAPINE 10 MG/1
10 TABLET ORAL NIGHTLY
COMMUNITY

## 2025-03-28 NOTE — TELEPHONE ENCOUNTER
ENT note and HST from 12/2/2023 reviewed. Sent to scanning.    HST was inconclusive due to inadequate sleep time but suggested moderate obstructive sleep apnea. Also had several central sleep apnea events.

## 2025-04-03 NOTE — PROGRESS NOTES
[facility] part of [facility]  Return Patient Progress Note      HPI:     Chief Complaint   Patient presents with    Blood Pressure     High blood pressure check        Maurilio Peterson is a 34 year old male presenting for:    Has a significant  has a past medical history of Allergic rhinitis (Starts mid feb), Anxiety, Depression, Schizophrenia (HCC), and Sleep apnea (Diagnosed a few years ago).    Here to discuss elevated BP.  Was told bp was elevated at health appointment he was at.        Labs:   CMP:  Lab Results   Component Value Date/Time     01/25/2025 03:15 AM    K 4.0 01/25/2025 03:15 AM     01/25/2025 03:15 AM    CO2 25.0 01/25/2025 03:15 AM    CREATSERUM 0.97 01/25/2025 03:15 AM    CA 9.8 01/25/2025 03:15 AM     (H) 01/25/2025 03:15 AM    TP 7.6 01/25/2025 03:15 AM    ALB 5.0 (H) 01/25/2025 03:15 AM    ALKPHO 108 01/25/2025 03:15 AM    AST 13 01/25/2025 03:15 AM    ALT 22 01/25/2025 03:15 AM    BILT 0.3 01/25/2025 03:15 AM    TSH 0.956 08/26/2022 12:54 PM        CBC:  Lab Results   Component Value Date    WBC 7.4 01/25/2025    HGB 14.8 01/25/2025    HCT 41.6 01/25/2025    .0 01/25/2025    NEPERCENT 55.6 01/25/2025    LYPERCENT 35.1 01/25/2025    MOPERCENT 7.0 01/25/2025    EOPERCENT 1.8 01/25/2025    BAPERCENT 0.4 01/25/2025    NE 4.12 01/25/2025    LYMABS 2.60 01/25/2025    MOABSO 0.52 01/25/2025    EOABSO 0.13 01/25/2025    BAABSO 0.03 01/25/2025          Hemoglobin A1C, Microalbumin  Lab Results   Component Value Date/Time    A1C 5.6 09/16/2024 03:10 PM        Lipid panel  Lab Results   Component Value Date/Time    CHOLEST 176 09/16/2024 03:10 PM    HDL 40 09/16/2024 03:10 PM    TRIG 111 09/16/2024 03:10 PM     (H) 09/16/2024 03:10 PM    NONHDLC 136 (H) 09/16/2024 03:10 PM        Medications:  Current Outpatient Medications   Medication Sig Dispense Refill    OLANZapine 10 MG Oral Tab Take 1 tablet (10 mg total) by mouth nightly.      ARIPiprazole  Lauroxil ER (ARISTADA) 882 MG/3.2ML Intramuscular Prefilled Syringe Inject into the muscle.      hydrOXYzine Pamoate (VISTARIL) 25 MG Oral Cap Take 1 capsule (25 mg total) by mouth 3 (three) times daily as needed for Itching.        PMH:  Past Medical History:    Allergic rhinitis    Tree pollen and dust mites    Anxiety    Im Schizoaffective Bipolar type    Depression    Im Schizoaffective Bipolar type    Schizophrenia (HCC)    Sleep apnea               REVIEW OF SYSTEMS:   Review of Systems   Constitutional:  Negative for chills, fatigue, fever and unexpected weight change.   HENT:  Negative for congestion, ear pain, hearing loss, rhinorrhea, sinus pain and sore throat.    Eyes:  Negative for pain, redness and visual disturbance.   Respiratory:  Negative for apnea, cough, chest tightness, shortness of breath and wheezing.    Cardiovascular:  Negative for chest pain, palpitations and leg swelling.   Gastrointestinal:  Negative for abdominal distention, abdominal pain, blood in stool, constipation and nausea.   Endocrine: Negative for cold intolerance, heat intolerance and polyuria.   Genitourinary:  Negative for dysuria, hematuria and urgency.   Musculoskeletal:  Negative for arthralgias, back pain, gait problem, joint swelling, myalgias and neck pain.   Skin:  Negative for rash and wound.   Allergic/Immunologic: Negative for food allergies and immunocompromised state.   Neurological:  Negative for dizziness, seizures, facial asymmetry, speech difficulty, weakness, light-headedness, numbness and headaches.   Hematological:  Negative for adenopathy. Does not bruise/bleed easily.   Psychiatric/Behavioral:  Negative for behavioral problems, sleep disturbance and suicidal ideas. The patient is not nervous/anxious.             PHYSICAL EXAM:   /60   Pulse 118   Temp 97.4 °F (36.3 °C)   Ht 6' 1\" (1.854 m)   Wt 202 lb (91.6 kg)   SpO2 96%   BMI 26.65 kg/m²  Estimated body mass index is 26.65 kg/m² as  calculated from the following:    Height as of this encounter: 6' 1\" (1.854 m).    Weight as of this encounter: 202 lb (91.6 kg).     Wt Readings from Last 3 Encounters:   03/27/25 202 lb (91.6 kg)   03/25/25 195 lb (88.5 kg)   03/06/25 195 lb 6.4 oz (88.6 kg)       Physical Exam  Vitals reviewed.   Constitutional:       General: He is not in acute distress.     Appearance: He is well-developed.   HENT:      Head: Normocephalic and atraumatic.   Eyes:      Conjunctiva/sclera: Conjunctivae normal.      Pupils: Pupils are equal, round, and reactive to light.   Neck:      Thyroid: No thyromegaly.   Cardiovascular:      Rate and Rhythm: Normal rate and regular rhythm.      Heart sounds: Normal heart sounds, S1 normal and S2 normal. No murmur heard.     No friction rub. No gallop.   Pulmonary:      Effort: Pulmonary effort is normal. No respiratory distress.      Breath sounds: Normal breath sounds. No wheezing or rales.   Chest:      Chest wall: No tenderness.   Abdominal:      General: Bowel sounds are normal. There is no distension.      Palpations: Abdomen is soft. There is no mass.      Tenderness: There is no abdominal tenderness. There is no guarding or rebound.   Musculoskeletal:         General: No tenderness. Normal range of motion.      Cervical back: Normal range of motion.   Lymphadenopathy:      Cervical: No cervical adenopathy.   Skin:     General: Skin is warm.      Findings: No erythema or rash.   Neurological:      Mental Status: He is alert and oriented to person, place, and time.      Cranial Nerves: No cranial nerve deficit.      Deep Tendon Reflexes: Reflexes are normal and symmetric.   Psychiatric:         Behavior: Behavior normal.         Thought Content: Thought content normal.         Judgment: Judgment normal.               ASSESSMENT AND PLAN:   Patient is a 34 year old male who presents primarily presents for:    (R03.0) Elevated BP without diagnosis of hypertension  (primary encounter  diagnosis)  BP is normal today.  Will monitor.  No medication treatment warranted yet.          Health Maintenance:    Health Maintenance   Topic Date Due    COVID-19 Vaccine (2 - 2024-25 season) 09/01/2024    Annual Physical  09/16/2025    Influenza Vaccine (Season Ended) 10/01/2025    DTaP,Tdap,and Td Vaccines (9 - Td or Tdap) 09/16/2034    Annual Depression Screening  Completed    Pneumococcal Vaccine: Birth to 50yrs  Aged Out    Meningococcal B Vaccine  Aged Out         Meds & Refills for this Visit:  Requested Prescriptions      No prescriptions requested or ordered in this encounter       No orders of the defined types were placed in this encounter.      Imaging & Consults:  None          Return in about 3 months (around 6/27/2025) for Symptom monitoring.  Important follow up notes/labs for next visit      Patient indicates understanding of the above recommendations and agrees to the above plan.  Reasurrance and education provided. All questions answered.    Notified to call with any questions, complications, allergies, or worsening or changing symptoms as well as any side effects or complications from the treatments .  Red flags/ ER precautions discussed.    If diagnostic labs or imaging ordered advised patient to contact my office for results  24-48 hours after completion    This note was dictated using dragon speech recognition transcription software.  Typographical and transcription errors may be present.  Please call if any questions.       As part of our commitment to providing you with comprehensive, transparent, and timely access to your health information, we adhere to the guidelines set forth by the 21st Century Cures Act. This Act enhances your rights to access your electronic health information and ensures that you can easily obtain your medical records.  Please note that the verbage used in this note is intended for medical documentation and communication and may be interpreted as forthright.   Please do not hesitate to contact my office if you have any questions.            Dustin Patel MD  [facility]

## 2025-06-09 ENCOUNTER — OFFICE VISIT (OUTPATIENT)
Age: 35
End: 2025-06-09
Payer: MEDICAID

## 2025-06-09 VITALS
HEIGHT: 73 IN | WEIGHT: 206 LBS | TEMPERATURE: 97 F | SYSTOLIC BLOOD PRESSURE: 104 MMHG | DIASTOLIC BLOOD PRESSURE: 60 MMHG | HEART RATE: 85 BPM | BODY MASS INDEX: 27.3 KG/M2 | OXYGEN SATURATION: 95 %

## 2025-06-09 DIAGNOSIS — F25.9 SCHIZOAFFECTIVE DISORDER, UNSPECIFIED TYPE (HCC): ICD-10-CM

## 2025-06-09 DIAGNOSIS — R03.0 ELEVATED BP WITHOUT DIAGNOSIS OF HYPERTENSION: Primary | ICD-10-CM

## 2025-06-09 PROCEDURE — 99213 OFFICE O/P EST LOW 20 MIN: CPT | Performed by: INTERNAL MEDICINE

## 2025-06-09 NOTE — PROGRESS NOTES
[facility] part of [facility]  Return Patient Progress Note      HPI:     Chief Complaint   Patient presents with    Follow - Up     3 month f/u       Maurilio Peterson is a 35 year old male presenting for:    Has a significant  has a past medical history of Allergic rhinitis (Starts mid feb), Anxiety, Depression, Schizophrenia (HCC), and Sleep apnea (Diagnosed a few years ago).    Doing well.    Hx of schizoaffective bipolar disorder.  Doing very well on ariprazole and olanzapine.  Hydroxyzine PRN.        Labs:   CMP:  Lab Results   Component Value Date/Time     01/25/2025 03:15 AM    K 4.0 01/25/2025 03:15 AM     01/25/2025 03:15 AM    CO2 25.0 01/25/2025 03:15 AM    CREATSERUM 0.97 01/25/2025 03:15 AM    CA 9.8 01/25/2025 03:15 AM     (H) 01/25/2025 03:15 AM    TP 7.6 01/25/2025 03:15 AM    ALB 5.0 (H) 01/25/2025 03:15 AM    ALKPHO 108 01/25/2025 03:15 AM    AST 13 01/25/2025 03:15 AM    ALT 22 01/25/2025 03:15 AM    BILT 0.3 01/25/2025 03:15 AM    TSH 0.956 08/26/2022 12:54 PM        CBC:  Lab Results   Component Value Date    WBC 7.4 01/25/2025    HGB 14.8 01/25/2025    HCT 41.6 01/25/2025    .0 01/25/2025    NEPERCENT 55.6 01/25/2025    LYPERCENT 35.1 01/25/2025    MOPERCENT 7.0 01/25/2025    EOPERCENT 1.8 01/25/2025    BAPERCENT 0.4 01/25/2025    NE 4.12 01/25/2025    LYMABS 2.60 01/25/2025    MOABSO 0.52 01/25/2025    EOABSO 0.13 01/25/2025    BAABSO 0.03 01/25/2025          Hemoglobin A1C, Microalbumin  Lab Results   Component Value Date/Time    A1C 5.6 09/16/2024 03:10 PM        Lipid panel  Lab Results   Component Value Date/Time    CHOLEST 176 09/16/2024 03:10 PM    HDL 40 09/16/2024 03:10 PM    TRIG 111 09/16/2024 03:10 PM     (H) 09/16/2024 03:10 PM    NONHDLC 136 (H) 09/16/2024 03:10 PM        Medications:  Current Outpatient Medications   Medication Sig Dispense Refill    OLANZapine 10 MG Oral Tab Take 1 tablet (10 mg total) by mouth nightly.       ARIPiprazole Lauroxil ER (ARISTADA) 882 MG/3.2ML Intramuscular Prefilled Syringe Inject into the muscle.      hydrOXYzine Pamoate (VISTARIL) 25 MG Oral Cap Take 1 capsule (25 mg total) by mouth 3 (three) times daily as needed for Itching.        PMH:  Past Medical History:    Allergic rhinitis    Tree pollen and dust mites    Anxiety    Im Schizoaffective Bipolar type    Depression    Im Schizoaffective Bipolar type    Schizophrenia (HCC)    Sleep apnea    CPAP is not working.  I keep having nose problems and need surgeries to fix it.  I am looking for an alternative to the CPAP.               REVIEW OF SYSTEMS:   Review of Systems   Constitutional:  Negative for chills, fatigue, fever and unexpected weight change.   HENT:  Negative for congestion, ear pain, hearing loss, rhinorrhea, sinus pain and sore throat.    Eyes:  Negative for pain, redness and visual disturbance.   Respiratory:  Negative for apnea, cough, chest tightness, shortness of breath and wheezing.    Cardiovascular:  Negative for chest pain, palpitations and leg swelling.   Gastrointestinal:  Negative for abdominal distention, abdominal pain, blood in stool, constipation and nausea.   Endocrine: Negative for cold intolerance, heat intolerance and polyuria.   Genitourinary:  Negative for dysuria, hematuria and urgency.   Musculoskeletal:  Negative for arthralgias, back pain, gait problem, joint swelling, myalgias and neck pain.   Skin:  Negative for rash and wound.   Allergic/Immunologic: Negative for food allergies and immunocompromised state.   Neurological:  Negative for dizziness, seizures, facial asymmetry, speech difficulty, weakness, light-headedness, numbness and headaches.   Hematological:  Negative for adenopathy. Does not bruise/bleed easily.   Psychiatric/Behavioral:  Negative for behavioral problems, sleep disturbance and suicidal ideas. The patient is not nervous/anxious.             PHYSICAL EXAM:   /60   Pulse 85   Temp 97 °F  (36.1 °C)   Ht 6' 1\" (1.854 m)   Wt 206 lb (93.4 kg)   SpO2 95%   BMI 27.18 kg/m²  Estimated body mass index is 27.18 kg/m² as calculated from the following:    Height as of this encounter: 6' 1\" (1.854 m).    Weight as of this encounter: 206 lb (93.4 kg).     Wt Readings from Last 3 Encounters:   06/09/25 206 lb (93.4 kg)   03/27/25 202 lb (91.6 kg)   03/25/25 195 lb (88.5 kg)       Physical Exam  Vitals reviewed.   Constitutional:       General: He is not in acute distress.     Appearance: He is well-developed.   HENT:      Head: Normocephalic and atraumatic.   Eyes:      Conjunctiva/sclera: Conjunctivae normal.      Pupils: Pupils are equal, round, and reactive to light.   Neck:      Thyroid: No thyromegaly.   Cardiovascular:      Rate and Rhythm: Normal rate and regular rhythm.      Heart sounds: Normal heart sounds, S1 normal and S2 normal. No murmur heard.     No friction rub. No gallop.   Pulmonary:      Effort: Pulmonary effort is normal. No respiratory distress.      Breath sounds: Normal breath sounds. No wheezing or rales.   Chest:      Chest wall: No tenderness.   Abdominal:      General: Bowel sounds are normal. There is no distension.      Palpations: Abdomen is soft. There is no mass.      Tenderness: There is no abdominal tenderness. There is no guarding or rebound.   Musculoskeletal:         General: No tenderness. Normal range of motion.      Cervical back: Normal range of motion.   Lymphadenopathy:      Cervical: No cervical adenopathy.   Skin:     General: Skin is warm.      Findings: No erythema or rash.   Neurological:      Mental Status: He is alert and oriented to person, place, and time.      Cranial Nerves: No cranial nerve deficit.      Deep Tendon Reflexes: Reflexes are normal and symmetric.   Psychiatric:         Behavior: Behavior normal.         Thought Content: Thought content normal.         Judgment: Judgment normal.               ASSESSMENT AND PLAN:   Patient is a 35 year old  male who presents primarily presents for:    (R03.0) Elevated BP without diagnosis of hypertension  (primary encounter diagnosis)  Plan: Controlled.      (F25.9) Schizoaffective disorder, unspecified type (HCC)  Plan: see HPI        Health Maintenance:    Health Maintenance   Topic Date Due    COVID-19 Vaccine (2 - 2024-25 season) 09/01/2024    Annual Physical  09/16/2025    Influenza Vaccine (Season Ended) 10/01/2025    DTaP,Tdap,and Td Vaccines (9 - Td or Tdap) 09/16/2034    Annual Depression Screening  Completed    Pneumococcal Vaccine: Birth to 50yrs  Aged Out    Meningococcal B Vaccine  Aged Out         Meds & Refills for this Visit:  Requested Prescriptions      No prescriptions requested or ordered in this encounter       No orders of the defined types were placed in this encounter.      Imaging & Consults:  None          No follow-ups on file.  Important follow up notes/labs for next visit      Patient indicates understanding of the above recommendations and agrees to the above plan.  Reasurrance and education provided. All questions answered.    Notified to call with any questions, complications, allergies, or worsening or changing symptoms as well as any side effects or complications from the treatments .  Red flags/ ER precautions discussed.    If diagnostic labs or imaging ordered advised patient to contact my office for results  24-48 hours after completion    This note was dictated using dragon speech recognition transcription software.  Typographical and transcription errors may be present.  Please call if any questions.       As part of our commitment to providing you with comprehensive, transparent, and timely access to your health information, we adhere to the guidelines set forth by the 21st Century Cures Act. This Act enhances your rights to access your electronic health information and ensures that you can easily obtain your medical records.  Please note that the verbage used in this note is  intended for medical documentation and communication and may be interpreted as forthright.  Please do not hesitate to contact my office if you have any questions.            Dustin Patel MD  [facility]

## 2025-07-22 ENCOUNTER — TELEPHONE (OUTPATIENT)
Age: 35
End: 2025-07-22

## 2025-07-22 NOTE — TELEPHONE ENCOUNTER
Pt called asking if he can come in to get his meningitis shot/ Pt will need for school    Please advise

## 2025-07-30 ENCOUNTER — NURSE ONLY (OUTPATIENT)
Age: 35
End: 2025-07-30
Payer: MEDICAID

## 2025-07-30 VITALS — TEMPERATURE: 97 F

## 2025-07-30 PROCEDURE — 90734 MENACWYD/MENACWYCRM VACC IM: CPT | Performed by: INTERNAL MEDICINE

## 2025-07-30 PROCEDURE — 90471 IMMUNIZATION ADMIN: CPT | Performed by: INTERNAL MEDICINE

## (undated) DIAGNOSIS — J30.9 ALLERGIC RHINITIS, UNSPECIFIED SEASONALITY, UNSPECIFIED TRIGGER: Primary | ICD-10-CM

## (undated) DEVICE — Device

## (undated) DEVICE — PAD DRSG 8X3IN CRD POLY CTN ABS PERFORATE FILM LF STRL

## (undated) DEVICE — SUTURE 4-0 SC-1 18IN PLN GUT MONO ABS YELLOWISH TAN 1824H

## (undated) DEVICE — GLOVE SURG 7.5 PROTEXIS PI LF CRM PF BEAD CUFF STRL PLISPRN

## (undated) DEVICE — SYRINGE 1ML GRAD STRL MED LF DISP LL

## (undated) DEVICE — POSITIONER OR RSPBRY 8.5X8X4IN HEAD FOAM HI RSLNT SLOT LF

## (undated) DEVICE — WATER STRL 1000ML PLASTIC POUR BTL LF